# Patient Record
Sex: MALE | Race: WHITE | NOT HISPANIC OR LATINO | Employment: FULL TIME | URBAN - METROPOLITAN AREA
[De-identification: names, ages, dates, MRNs, and addresses within clinical notes are randomized per-mention and may not be internally consistent; named-entity substitution may affect disease eponyms.]

---

## 2023-04-20 ENCOUNTER — OFFICE VISIT (OUTPATIENT)
Dept: PRIMARY CARE | Facility: CLINIC | Age: 63
End: 2023-04-20
Payer: COMMERCIAL

## 2023-04-20 DIAGNOSIS — F41.1 GENERALIZED ANXIETY DISORDER: Primary | ICD-10-CM

## 2023-04-20 DIAGNOSIS — R35.1 NOCTURIA: ICD-10-CM

## 2023-04-20 DIAGNOSIS — R73.9 HYPERGLYCEMIA: ICD-10-CM

## 2023-04-20 DIAGNOSIS — I10 BENIGN ESSENTIAL HTN: Primary | ICD-10-CM

## 2023-04-20 DIAGNOSIS — E78.49 OTHER HYPERLIPIDEMIA: ICD-10-CM

## 2023-04-20 DIAGNOSIS — Z13.220 LIPID SCREENING: ICD-10-CM

## 2023-04-20 DIAGNOSIS — F41.1 GENERALIZED ANXIETY DISORDER: ICD-10-CM

## 2023-04-20 PROBLEM — G62.9 PERIPHERAL NEUROPATHY: Status: ACTIVE | Noted: 2023-04-20

## 2023-04-20 PROBLEM — I48.11 LONGSTANDING PERSISTENT ATRIAL FIBRILLATION (MULTI): Status: ACTIVE | Noted: 2023-04-20

## 2023-04-20 PROBLEM — R25.2 MUSCLE CRAMPS: Status: ACTIVE | Noted: 2023-04-20

## 2023-04-20 PROBLEM — E78.5 HLD (HYPERLIPIDEMIA): Status: ACTIVE | Noted: 2023-04-20

## 2023-04-20 PROBLEM — J20.9 ACUTE BRONCHITIS: Status: ACTIVE | Noted: 2023-04-20

## 2023-04-20 PROBLEM — I48.91 ATRIAL FIBRILLATION (MULTI): Status: ACTIVE | Noted: 2023-04-20

## 2023-04-20 PROBLEM — E87.5 POTASSIUM EXCESS: Status: ACTIVE | Noted: 2023-04-20

## 2023-04-20 PROBLEM — F41.9 ANXIETY DISORDER: Status: ACTIVE | Noted: 2023-04-20

## 2023-04-20 PROBLEM — K21.9 GERD WITHOUT ESOPHAGITIS: Status: ACTIVE | Noted: 2023-04-20

## 2023-04-20 PROBLEM — R07.9 CHEST PAIN: Status: ACTIVE | Noted: 2023-04-20

## 2023-04-20 PROBLEM — D12.6 TUBULAR ADENOMA OF COLON: Status: ACTIVE | Noted: 2023-04-20

## 2023-04-20 PROBLEM — K29.70 GASTRITIS: Status: ACTIVE | Noted: 2023-04-20

## 2023-04-20 PROBLEM — R93.1 ABNORMAL SCREENING CARDIAC CT: Status: ACTIVE | Noted: 2023-04-20

## 2023-04-20 PROBLEM — T78.40XA ALLERGIC REACTION: Status: ACTIVE | Noted: 2023-04-20

## 2023-04-20 PROBLEM — J02.9 ACUTE PHARYNGITIS: Status: ACTIVE | Noted: 2023-04-20

## 2023-04-20 PROBLEM — M54.9 BACK PAIN: Status: ACTIVE | Noted: 2023-04-20

## 2023-04-20 LAB
AMPHETAMINE (PRESENCE) IN URINE BY SCREEN METHOD: NORMAL
BARBITURATES PRESENCE IN URINE BY SCREEN METHOD: NORMAL
BENZODIAZEPINE (PRESENCE) IN URINE BY SCREEN METHOD: NORMAL
CANNABINOIDS IN URINE BY SCREEN METHOD: NORMAL
COCAINE (PRESENCE) IN URINE BY SCREEN METHOD: NORMAL
DRUG SCREEN COMMENT URINE: NORMAL
FENTANYL URINE: NORMAL
METHADONE (PRESENCE) IN URINE BY SCREEN METHOD: NORMAL
OPIATES (PRESENCE) IN URINE BY SCREEN METHOD: NORMAL
OXYCODONE (PRESENCE) IN URINE BY SCREEN METHOD: NORMAL
PHENCYCLIDINE (PRESENCE) IN URINE BY SCREEN METHOD: NORMAL
POC HDL CHOLESTEROL: 37 MG/DL (ref 0–40)
POC LDL CHOLESTEROL: 85 MG/DL (ref 0–100)
POC NON-HDL CHOLESTEROL: 116 MG/DL (ref 0–130)
POC TOTAL CHOLESTEROL/HDL RATIO: 4.1 (ref 0–4.5)
POC TOTAL CHOLESTEROL: 153 MG/DL (ref 0–199)
POC TRIGLYCERIDES: 157 MG/DL (ref 0–150)

## 2023-04-20 PROCEDURE — 80061 LIPID PANEL: CPT | Performed by: INTERNAL MEDICINE

## 2023-04-20 PROCEDURE — 99214 OFFICE O/P EST MOD 30 MIN: CPT | Performed by: INTERNAL MEDICINE

## 2023-04-20 PROCEDURE — 80307 DRUG TEST PRSMV CHEM ANLYZR: CPT

## 2023-04-20 RX ORDER — VIT C/E/ZN/COPPR/LUTEIN/ZEAXAN 250MG-90MG
1 CAPSULE ORAL DAILY
COMMUNITY

## 2023-04-20 RX ORDER — ATORVASTATIN CALCIUM 40 MG/1
TABLET, FILM COATED ORAL
COMMUNITY
End: 2023-05-17 | Stop reason: SDUPTHER

## 2023-04-20 RX ORDER — APIXABAN 5 MG/1
1 TABLET, FILM COATED ORAL 2 TIMES DAILY
COMMUNITY
Start: 2022-06-29 | End: 2023-07-27 | Stop reason: ALTCHOICE

## 2023-04-20 RX ORDER — MULTIVIT WITH MINERALS/HERBS
1 TABLET ORAL DAILY
COMMUNITY
Start: 2022-07-26 | End: 2024-02-27 | Stop reason: ALTCHOICE

## 2023-04-20 RX ORDER — EZETIMIBE 10 MG/1
10 TABLET ORAL DAILY
COMMUNITY
End: 2024-02-20 | Stop reason: SDUPTHER

## 2023-04-20 RX ORDER — ACETAMINOPHEN 325 MG/1
TABLET ORAL EVERY 6 HOURS PRN
COMMUNITY
Start: 2022-01-27

## 2023-04-20 RX ORDER — ALPRAZOLAM 0.25 MG/1
0.25 TABLET ORAL DAILY PRN
Qty: 30 TABLET | Refills: 0 | Status: SHIPPED | OUTPATIENT
Start: 2023-04-20

## 2023-04-20 RX ORDER — ALPRAZOLAM 0.25 MG/1
1 TABLET ORAL DAILY PRN
COMMUNITY
Start: 2020-10-01 | End: 2023-04-20 | Stop reason: SDUPTHER

## 2023-04-20 RX ORDER — CARVEDILOL 25 MG/1
25 TABLET ORAL 2 TIMES DAILY
COMMUNITY
End: 2024-02-20 | Stop reason: SDUPTHER

## 2023-04-20 RX ORDER — ALPRAZOLAM 0.25 MG/1
0.25 TABLET ORAL NIGHTLY PRN
Qty: 30 TABLET | Refills: 0 | Status: CANCELLED | OUTPATIENT
Start: 2023-04-20 | End: 2023-05-20

## 2023-04-20 RX ORDER — DILTIAZEM HYDROCHLORIDE 240 MG/1
1 CAPSULE, COATED, EXTENDED RELEASE ORAL DAILY
COMMUNITY
Start: 2022-11-28 | End: 2023-08-29 | Stop reason: ALTCHOICE

## 2023-04-20 RX ORDER — METOPROLOL TARTRATE 25 MG/1
1 TABLET, FILM COATED ORAL 2 TIMES DAILY
COMMUNITY
Start: 2023-03-02 | End: 2023-07-27 | Stop reason: ALTCHOICE

## 2023-04-20 RX ORDER — CHLORTHALIDONE 25 MG/1
1 TABLET ORAL DAILY
COMMUNITY
Start: 2023-03-27 | End: 2023-07-27 | Stop reason: ALTCHOICE

## 2023-04-20 RX ORDER — BENAZEPRIL HYDROCHLORIDE 40 MG/1
40 TABLET ORAL DAILY
COMMUNITY
End: 2023-12-26 | Stop reason: SDUPTHER

## 2023-04-20 RX ORDER — AMLODIPINE AND BENAZEPRIL HYDROCHLORIDE 10; 40 MG/1; MG/1
CAPSULE ORAL
COMMUNITY
End: 2024-02-20 | Stop reason: ALTCHOICE

## 2023-04-20 NOTE — PROGRESS NOTES
Enrrique Velásquez is a 62 y.o. male   Patient with a past medical history of Atrial Fibrillation (post DCCV), HTN, HLD, Anxiety disorder, GERD, elevated calciums scores. Tubular Adenoma due in 2026    Had fatigue from Metoprolol  Switched back to Carvedilol    Feels fine  No chest pain/  SOB/ dizziness  BM OK  Energy level ok  Appetite OK             Review of Systems     Constitutional: no fever, no chills, not feeling poorly, not feeling tired and no recent weight gain, no recent weight loss.   ENT: no earache, no hearing loss, no nosebleeds, no nasal discharge, no sore throat and no hoarseness.   Cardiovascular: the heart rate was not slow, the heart rate was not fast, no chest pain, no palpitations, no intermittent leg claudication and no lower extremity edema.   Respiratory: no cough, wheezing or shortness of breath at rest or exertion  Gastrointestinal: no abdominal pain, no constipation, no melena, no nausea, no diarrhea, no vomiting and no blood in stools.   Musculoskeletal: no arthralgias, no myalgias, no back pain, no joint swelling, no joint stiffness, no limb pain and no limb swelling.   Integumentary: no skin rashes, no skin lesions, no itching, no skin wound and no dry skin.   Neurological: no headache, no confusion, no numbness, no dizziness, no tingling and no fainting.   All other systems have been reviewed and are negative for complaint.       There were no vitals filed for this visit.     Physical Exam     Constitutional   General appearance: Alert and in no acute distress.     Pulmonary   Respiratory assessment: No respiratory distress, normal respiratory rhythm and effort.    Auscultation of Lungs: Clear bilateral breath sounds.   Cardiovascular   Auscultation of heart: Apical pulse normal, heart rate and rhythm normal, normal S1 and S2, no murmurs and no pericardial rub.    Exam for edema: No peripheral edema.   Abdomen   Abdominal Exam: No bruits, normal bowel sounds, soft, non-tender, no  abdominal mass palpated.    Liver and Spleen exam: No hepato-splenomegaly.   Musculoskeletal   Examination of gait: Normal.    Inspection of digits and nails: No clubbing or cyanosis of the fingernails.    Inspection/palpation of joints, bones and muscles: No joint swelling. Normal movement of all extremities.   Skin   Skin inspection: Normal skin color and pigmentation, normal skin turgor and no visible rash.   Neurologic   Cranial nerves: Nerves 2-12 were intact, no focal neuro defects.     Assessment/Plan             Patient with a past medical history of Atrial Fibrillation (post DCCV), HTN, HLD with elevated CAC, Anxiety disorder, GERD, elevated calciums scores. Tubular Adenoma due in 2026    # HTN  condition is stable  continue current medications      # Afib  s/p ablation now in NSR  ASA    # HLD/ Elevated CAC  Target LDL is less than 70  Avoid processed meats/ sugars/juices Instead eat fresh fruit  Add walnuts and almonds to your diet  exercise 6 days a week for 30 minutes        # Anxiety disorder  stable  OARRS report has been run and reviewed - no suspicious or worrisome activity noted.  I have considered the risk of abuse, dependance, addiction, and diversion.    I believe it is clinically appropriate for this patient to continue taking this medication.     # Neuropathy  stable

## 2023-04-29 ENCOUNTER — LAB (OUTPATIENT)
Dept: LAB | Facility: LAB | Age: 63
End: 2023-04-29
Payer: COMMERCIAL

## 2023-04-29 DIAGNOSIS — R35.1 NOCTURIA: ICD-10-CM

## 2023-04-29 DIAGNOSIS — I10 BENIGN ESSENTIAL HTN: ICD-10-CM

## 2023-04-29 DIAGNOSIS — R73.9 HYPERGLYCEMIA: ICD-10-CM

## 2023-04-29 LAB
ALANINE AMINOTRANSFERASE (SGPT) (U/L) IN SER/PLAS: 39 U/L (ref 10–52)
ALBUMIN (G/DL) IN SER/PLAS: 4.3 G/DL (ref 3.4–5)
ALKALINE PHOSPHATASE (U/L) IN SER/PLAS: 74 U/L (ref 33–136)
ANION GAP IN SER/PLAS: 13 MMOL/L (ref 10–20)
ASPARTATE AMINOTRANSFERASE (SGOT) (U/L) IN SER/PLAS: 21 U/L (ref 9–39)
BILIRUBIN TOTAL (MG/DL) IN SER/PLAS: 0.8 MG/DL (ref 0–1.2)
CALCIUM (MG/DL) IN SER/PLAS: 9.3 MG/DL (ref 8.6–10.6)
CARBON DIOXIDE, TOTAL (MMOL/L) IN SER/PLAS: 27 MMOL/L (ref 21–32)
CHLORIDE (MMOL/L) IN SER/PLAS: 103 MMOL/L (ref 98–107)
CREATININE (MG/DL) IN SER/PLAS: 0.9 MG/DL (ref 0.5–1.3)
ERYTHROCYTE DISTRIBUTION WIDTH (RATIO) BY AUTOMATED COUNT: 12.4 % (ref 11.5–14.5)
ERYTHROCYTE MEAN CORPUSCULAR HEMOGLOBIN CONCENTRATION (G/DL) BY AUTOMATED: 32.8 G/DL (ref 32–36)
ERYTHROCYTE MEAN CORPUSCULAR VOLUME (FL) BY AUTOMATED COUNT: 89 FL (ref 80–100)
ERYTHROCYTES (10*6/UL) IN BLOOD BY AUTOMATED COUNT: 4.72 X10E12/L (ref 4.5–5.9)
ESTIMATED AVERAGE GLUCOSE FOR HBA1C: 128 MG/DL
GFR MALE: >90 ML/MIN/1.73M2
GLUCOSE (MG/DL) IN SER/PLAS: 113 MG/DL (ref 74–99)
HEMATOCRIT (%) IN BLOOD BY AUTOMATED COUNT: 41.8 % (ref 41–52)
HEMOGLOBIN (G/DL) IN BLOOD: 13.7 G/DL (ref 13.5–17.5)
HEMOGLOBIN A1C/HEMOGLOBIN TOTAL IN BLOOD: 6.1 %
LEUKOCYTES (10*3/UL) IN BLOOD BY AUTOMATED COUNT: 7.9 X10E9/L (ref 4.4–11.3)
NRBC (PER 100 WBCS) BY AUTOMATED COUNT: 0 /100 WBC (ref 0–0)
PLATELETS (10*3/UL) IN BLOOD AUTOMATED COUNT: 263 X10E9/L (ref 150–450)
POTASSIUM (MMOL/L) IN SER/PLAS: 4.3 MMOL/L (ref 3.5–5.3)
PROSTATE SPECIFIC ANTIGEN,SCREEN: 3.07 NG/ML (ref 0–4)
PROTEIN TOTAL: 6.8 G/DL (ref 6.4–8.2)
SODIUM (MMOL/L) IN SER/PLAS: 139 MMOL/L (ref 136–145)
THYROTROPIN (MIU/L) IN SER/PLAS BY DETECTION LIMIT <= 0.05 MIU/L: 0.81 MIU/L (ref 0.44–3.98)
UREA NITROGEN (MG/DL) IN SER/PLAS: 24 MG/DL (ref 6–23)

## 2023-04-29 PROCEDURE — 84443 ASSAY THYROID STIM HORMONE: CPT

## 2023-04-29 PROCEDURE — 36415 COLL VENOUS BLD VENIPUNCTURE: CPT

## 2023-04-29 PROCEDURE — 80053 COMPREHEN METABOLIC PANEL: CPT

## 2023-04-29 PROCEDURE — 85027 COMPLETE CBC AUTOMATED: CPT

## 2023-04-29 PROCEDURE — 83036 HEMOGLOBIN GLYCOSYLATED A1C: CPT

## 2023-04-29 PROCEDURE — 84153 ASSAY OF PSA TOTAL: CPT

## 2023-05-17 DIAGNOSIS — E78.49 OTHER HYPERLIPIDEMIA: Primary | ICD-10-CM

## 2023-05-18 RX ORDER — ATORVASTATIN CALCIUM 40 MG/1
40 TABLET, FILM COATED ORAL ONCE
Qty: 90 TABLET | Refills: 0 | Status: SHIPPED | OUTPATIENT
Start: 2023-05-18 | End: 2023-08-10

## 2023-07-27 ENCOUNTER — OFFICE VISIT (OUTPATIENT)
Dept: PRIMARY CARE | Facility: CLINIC | Age: 63
End: 2023-07-27
Payer: COMMERCIAL

## 2023-07-27 VITALS
TEMPERATURE: 98.1 F | DIASTOLIC BLOOD PRESSURE: 79 MMHG | SYSTOLIC BLOOD PRESSURE: 124 MMHG | BODY MASS INDEX: 34.59 KG/M2 | WEIGHT: 262.2 LBS | HEART RATE: 70 BPM

## 2023-07-27 DIAGNOSIS — I10 BENIGN ESSENTIAL HTN: Primary | ICD-10-CM

## 2023-07-27 DIAGNOSIS — F41.9 ANXIETY DISORDER, UNSPECIFIED TYPE: ICD-10-CM

## 2023-07-27 DIAGNOSIS — E78.49 OTHER HYPERLIPIDEMIA: ICD-10-CM

## 2023-07-27 DIAGNOSIS — E74.39 GLUCOSE INTOLERANCE: ICD-10-CM

## 2023-07-27 DIAGNOSIS — E13.69 OTHER SPECIFIED DIABETES MELLITUS WITH OTHER SPECIFIED COMPLICATION, UNSPECIFIED WHETHER LONG TERM INSULIN USE (MULTI): ICD-10-CM

## 2023-07-27 LAB — POC FINGERSTICK BLOOD GLUCOSE: 113 MG/DL (ref 70–100)

## 2023-07-27 PROCEDURE — 3078F DIAST BP <80 MM HG: CPT | Performed by: INTERNAL MEDICINE

## 2023-07-27 PROCEDURE — 4010F ACE/ARB THERAPY RXD/TAKEN: CPT | Performed by: INTERNAL MEDICINE

## 2023-07-27 PROCEDURE — 82962 GLUCOSE BLOOD TEST: CPT | Performed by: INTERNAL MEDICINE

## 2023-07-27 PROCEDURE — 3044F HG A1C LEVEL LT 7.0%: CPT | Performed by: INTERNAL MEDICINE

## 2023-07-27 PROCEDURE — 3074F SYST BP LT 130 MM HG: CPT | Performed by: INTERNAL MEDICINE

## 2023-07-27 PROCEDURE — 99214 OFFICE O/P EST MOD 30 MIN: CPT | Performed by: INTERNAL MEDICINE

## 2023-07-27 RX ORDER — AZITHROMYCIN 250 MG/1
TABLET, FILM COATED ORAL
COMMUNITY
End: 2024-02-27 | Stop reason: ALTCHOICE

## 2023-07-27 RX ORDER — CLINDAMYCIN HYDROCHLORIDE 150 MG/1
CAPSULE ORAL
COMMUNITY
End: 2024-02-27 | Stop reason: ALTCHOICE

## 2023-07-27 RX ORDER — CHLORHEXIDINE GLUCONATE ORAL RINSE 1.2 MG/ML
SOLUTION DENTAL
COMMUNITY
End: 2024-02-27 | Stop reason: ALTCHOICE

## 2023-07-27 NOTE — PROGRESS NOTES
Enrrique Velásquez is a 62 y.o. male   Patient with a past medical history of Atrial Fibrillation (post DCCV), HTN, HLD, Anxiety disorder, GERD, elevated calciums scores. Glucose intolerance, Tubular Adenoma due in 2026    Has been sweating a lot and peeing a lot  Always thirsty  Feels it is the chlorthalidone      Feels fine  No chest pain/  SOB/ dizziness  BM OK  Energy level ok  Appetite OK             Review of Systems     Constitutional: no fever, no chills, not feeling poorly, not feeling tired and no recent weight gain, no recent weight loss.   ENT: no earache, no hearing loss, no nosebleeds, no nasal discharge, no sore throat and no hoarseness.   Cardiovascular: the heart rate was not slow, the heart rate was not fast, no chest pain, no palpitations, no intermittent leg claudication and no lower extremity edema.   Respiratory: no cough, wheezing or shortness of breath at rest or exertion  Gastrointestinal: no abdominal pain, no constipation, no melena, no nausea, no diarrhea, no vomiting and no blood in stools.   Musculoskeletal: no arthralgias, no myalgias, no back pain, no joint swelling, no joint stiffness, no limb pain and no limb swelling.   Integumentary: no skin rashes, no skin lesions, no itching, no skin wound and no dry skin.   Neurological: no headache, no confusion, no numbness, no dizziness, no tingling and no fainting.   All other systems have been reviewed and are negative for complaint.       Vitals:    07/27/23 1612   BP: 124/79   Pulse: 70   Temp: 36.7 °C (98.1 °F)        Physical Exam     Constitutional   General appearance: Alert and in no acute distress.     Pulmonary   Respiratory assessment: No respiratory distress, normal respiratory rhythm and effort.    Auscultation of Lungs: Clear bilateral breath sounds.   Cardiovascular   Auscultation of heart: Apical pulse normal, heart rate and rhythm normal, normal S1 and S2, no murmurs and no pericardial rub.    Exam for edema: No peripheral  edema.   Abdomen   Abdominal Exam: No bruits, normal bowel sounds, soft, non-tender, no abdominal mass palpated.    Liver and Spleen exam: No hepato-splenomegaly.   Musculoskeletal   Examination of gait: Normal.    Inspection of digits and nails: No clubbing or cyanosis of the fingernails.    Inspection/palpation of joints, bones and muscles: No joint swelling. Normal movement of all extremities.   Skin   Skin inspection: Normal skin color and pigmentation, normal skin turgor and no visible rash.   Neurologic   Cranial nerves: Nerves 2-12 were intact, no focal neuro defects.     Assessment/Plan             Patient with a past medical history of Atrial Fibrillation (post DCCV), HTN, HLD with elevated CAC, Anxiety disorder, GERD, elevated calciums scores. Glucose intolerance, Tubular Adenoma due in 2026    # HTN  DC Chlorthalidone      # Afib  s/p ablation now in NSR  ASA    # HLD/ Elevated CAC  Target LDL is less than 70  Avoid processed meats/ sugars/juices Instead eat fresh fruit  Add walnuts and almonds to your diet  exercise 6 days a week for 30 minutes        # Anxiety disorder  stable  OARRS report has been run and reviewed - no suspicious or worrisome activity noted.  I have considered the risk of abuse, dependance, addiction, and diversion.    I believe it is clinically appropriate for this patient to continue taking this medication.     # Neuropathy  stable    #Glucose Intolerance  Watch diet   Lose weight

## 2023-08-03 ENCOUNTER — PATIENT OUTREACH (OUTPATIENT)
Dept: CARE COORDINATION | Facility: CLINIC | Age: 63
End: 2023-08-03
Payer: COMMERCIAL

## 2023-08-03 NOTE — PROGRESS NOTES
Discharge Facility:[facility]  Discharge Diagnosis:AFIB RVR  Admission Date:08/01/23  Discharge Date: 08/02/23    PCP Appointment Date:  Specialist Appointment Date:   Hospital Encounter and Summary: Linked   See discharge assessment below for further details  Engagement  Call Start Time: 0910 (8/3/2023  9:10 AM)    Medications  Medications reviewed with patient/caregiver?: Yes (new meds only apixaban 5mg bid) (8/3/2023  9:10 AM)  Is the patient having any side effects they believe may be caused by any medication additions or changes?: No (8/3/2023  9:10 AM)  Does the patient have all medications ordered at discharge?: Yes (8/3/2023  9:10 AM)  Is the patient taking all medications as directed (includes completed medication regime)?: Yes (8/3/2023  9:10 AM)    Appointments  Does the patient have a primary care provider?: Yes (8/3/2023  9:10 AM)  Care Management Interventions: Advised patient to make appointment (8/3/2023  9:10 AM)    Self Management  Has home health visited the patient within 72 hours of discharge?: Not applicable (8/3/2023  9:10 AM)  Has all Durable Medical Equipment (DME) been delivered?: No (8/3/2023  9:10 AM)    Patient Teaching  Does the patient have access to their discharge instructions?: Yes (8/3/2023  9:10 AM)  Care Management Interventions: Reviewed instructions with patient (8/3/2023  9:10 AM)  What is the patient's perception of their health status since discharge?: Improving (8/3/2023  9:10 AM)    Wrap Up  Call End Time: 0915 (8/3/2023  9:10 AM)

## 2023-08-10 DIAGNOSIS — E78.49 OTHER HYPERLIPIDEMIA: ICD-10-CM

## 2023-08-10 RX ORDER — ATORVASTATIN CALCIUM 40 MG/1
40 TABLET, FILM COATED ORAL DAILY
Qty: 90 TABLET | Refills: 0 | Status: SHIPPED | OUTPATIENT
Start: 2023-08-10 | End: 2023-10-31

## 2023-08-17 ENCOUNTER — PATIENT OUTREACH (OUTPATIENT)
Dept: CARE COORDINATION | Facility: CLINIC | Age: 63
End: 2023-08-17
Payer: COMMERCIAL

## 2023-08-17 NOTE — PROGRESS NOTES
Unable to reach patient for call back after patient's follow up appointment with PCP.   DAVIDM with call back number for patient to call if needed   If no voicemail available call attempts x 2 were made to contact the patient to assist with any questions or concerns patient may have.

## 2023-08-29 ENCOUNTER — OFFICE VISIT (OUTPATIENT)
Dept: PRIMARY CARE | Facility: CLINIC | Age: 63
End: 2023-08-29
Payer: COMMERCIAL

## 2023-08-29 VITALS
TEMPERATURE: 97.7 F | RESPIRATION RATE: 16 BRPM | WEIGHT: 259.4 LBS | DIASTOLIC BLOOD PRESSURE: 90 MMHG | HEART RATE: 70 BPM | SYSTOLIC BLOOD PRESSURE: 140 MMHG | BODY MASS INDEX: 34.22 KG/M2

## 2023-08-29 DIAGNOSIS — I48.0 PAROXYSMAL ATRIAL FIBRILLATION (MULTI): Primary | ICD-10-CM

## 2023-08-29 DIAGNOSIS — I10 BENIGN ESSENTIAL HTN: ICD-10-CM

## 2023-08-29 PROBLEM — J36 PERITONSILLAR ABSCESS: Status: ACTIVE | Noted: 2020-05-12

## 2023-08-29 PROBLEM — I95.9 HYPOTENSION: Status: ACTIVE | Noted: 2023-08-29

## 2023-08-29 PROBLEM — R74.8 HIGH LEVEL OF CARDIAC MARKER: Status: ACTIVE | Noted: 2023-08-29

## 2023-08-29 PROCEDURE — 3080F DIAST BP >= 90 MM HG: CPT | Performed by: INTERNAL MEDICINE

## 2023-08-29 PROCEDURE — 99213 OFFICE O/P EST LOW 20 MIN: CPT | Performed by: INTERNAL MEDICINE

## 2023-08-29 PROCEDURE — 3077F SYST BP >= 140 MM HG: CPT | Performed by: INTERNAL MEDICINE

## 2023-08-29 RX ORDER — PRAVASTATIN SODIUM 80 MG/1
TABLET ORAL
COMMUNITY
End: 2023-10-31 | Stop reason: ALTCHOICE

## 2023-08-29 RX ORDER — ASPIRIN 81 MG/1
1 TABLET ORAL DAILY
COMMUNITY
Start: 2023-02-14

## 2023-08-29 RX ORDER — MAGNESIUM HYDROXIDE 400 MG/5ML
1 SUSPENSION, ORAL (FINAL DOSE FORM) ORAL DAILY
COMMUNITY

## 2023-08-29 NOTE — PROGRESS NOTES
Enrrique Velásquez is a 62 y.o. male   Patient with a past medical history of Atrial Fibrillation (post DCCV), HTN, HLD, Anxiety disorder, GERD, elevated calciums scores. Glucose intolerance, Tubular Adenoma due in 2026    Admit Aug 1  Disch Aug 2      Feels fine  No chest pain/  SOB/ dizziness  BM OK  Energy level ok  Appetite OK           Review of Systems     Constitutional: no fever, no chills, not feeling poorly, not feeling tired and no recent weight gain, no recent weight loss.   ENT: no earache, no hearing loss, no nosebleeds, no nasal discharge, no sore throat and no hoarseness.   Cardiovascular: the heart rate was not slow, the heart rate was not fast, no chest pain, no palpitations, no intermittent leg claudication and no lower extremity edema.   Respiratory: no cough, wheezing or shortness of breath at rest or exertion  Gastrointestinal: no abdominal pain, no constipation, no melena, no nausea, no diarrhea, no vomiting and no blood in stools.   Musculoskeletal: no arthralgias, no myalgias, no back pain, no joint swelling, no joint stiffness, no limb pain and no limb swelling.   Integumentary: no skin rashes, no skin lesions, no itching, no skin wound and no dry skin.   Neurological: no headache, no confusion, no numbness, no dizziness, no tingling and no fainting.   All other systems have been reviewed and are negative for complaint.       Vitals:    08/29/23 1639   Temp: 36.5 °C (97.7 °F)        Physical Exam     Constitutional   General appearance: Alert and in no acute distress.     Pulmonary   Respiratory assessment: No respiratory distress, normal respiratory rhythm and effort.    Auscultation of Lungs: Clear bilateral breath sounds.   Cardiovascular   Auscultation of heart: Apical pulse normal, heart rate and rhythm normal, normal S1 and S2, no murmurs and no pericardial rub.    Exam for edema: No peripheral edema.   Abdomen   Abdominal Exam: No bruits, normal bowel sounds, soft, non-tender, no  abdominal mass palpated.    Liver and Spleen exam: No hepato-splenomegaly.   Musculoskeletal   Examination of gait: Normal.    Inspection of digits and nails: No clubbing or cyanosis of the fingernails.    Inspection/palpation of joints, bones and muscles: No joint swelling. Normal movement of all extremities.   Skin   Skin inspection: Normal skin color and pigmentation, normal skin turgor and no visible rash.   Neurologic   Cranial nerves: Nerves 2-12 were intact, no focal neuro defects.     Assessment/Plan             Patient with a past medical history of Atrial Fibrillation (post DCCV), HTN, HLD with elevated CAC, Anxiety disorder, GERD, elevated calciums scores. Glucose intolerance, Tubular Adenoma due in 2026    # HTN  Stable  BP at home is average 120/80      # Afib  PAF  S/p cardioversion  Back on Eliquis  Light duty until October          # Anxiety disorder  stable  OARRS report has been run and reviewed - no suspicious or worrisome activity noted.  I have considered the risk of abuse, dependance, addiction, and diversion.    I believe it is clinically appropriate for this patient to continue taking this medication.

## 2023-08-31 ENCOUNTER — PATIENT OUTREACH (OUTPATIENT)
Dept: CARE COORDINATION | Facility: CLINIC | Age: 63
End: 2023-08-31
Payer: COMMERCIAL

## 2023-08-31 NOTE — PROGRESS NOTES
Unable to reach patient for one month post discharge follow up call.   LVM with call back number for patient to call if needed   If no voicemail available call attempts x 2 were made to contact the patient to assist with any questions or concerns patient may have.

## 2023-10-30 ENCOUNTER — PATIENT OUTREACH (OUTPATIENT)
Dept: CARE COORDINATION | Facility: CLINIC | Age: 63
End: 2023-10-30
Payer: COMMERCIAL

## 2023-10-30 NOTE — PROGRESS NOTES
Patient has met target of no readmission for (90) days post (hospital,  discharge and is graduated from Transitional Care Management program at this time.

## 2023-10-31 ENCOUNTER — OFFICE VISIT (OUTPATIENT)
Dept: PRIMARY CARE | Facility: CLINIC | Age: 63
End: 2023-10-31
Payer: COMMERCIAL

## 2023-10-31 VITALS
SYSTOLIC BLOOD PRESSURE: 130 MMHG | BODY MASS INDEX: 34.22 KG/M2 | TEMPERATURE: 97.6 F | DIASTOLIC BLOOD PRESSURE: 80 MMHG | HEART RATE: 72 BPM | RESPIRATION RATE: 16 BRPM | WEIGHT: 259.4 LBS

## 2023-10-31 DIAGNOSIS — E78.49 OTHER HYPERLIPIDEMIA: ICD-10-CM

## 2023-10-31 DIAGNOSIS — Z23 INFLUENZA VACCINATION ADMINISTERED AT CURRENT VISIT: ICD-10-CM

## 2023-10-31 DIAGNOSIS — I48.0 PAROXYSMAL ATRIAL FIBRILLATION (MULTI): ICD-10-CM

## 2023-10-31 DIAGNOSIS — I10 BENIGN ESSENTIAL HTN: Primary | ICD-10-CM

## 2023-10-31 LAB
POC HDL CHOLESTEROL: 41 MG/DL (ref 0–40)
POC LDL CHOLESTEROL: 108 MG/DL (ref 0–100)
POC NON-HDL CHOLESTEROL: 127 MG/DL (ref 0–130)
POC TOTAL CHOLESTEROL/HDL RATIO: 4.1 (ref 0–4.5)
POC TOTAL CHOLESTEROL: 169 MG/DL (ref 0–199)
POC TRIGLYCERIDES: 96 MG/DL (ref 0–150)

## 2023-10-31 PROCEDURE — 80061 LIPID PANEL: CPT | Performed by: INTERNAL MEDICINE

## 2023-10-31 PROCEDURE — 90686 IIV4 VACC NO PRSV 0.5 ML IM: CPT | Performed by: INTERNAL MEDICINE

## 2023-10-31 PROCEDURE — 3075F SYST BP GE 130 - 139MM HG: CPT | Performed by: INTERNAL MEDICINE

## 2023-10-31 PROCEDURE — 3079F DIAST BP 80-89 MM HG: CPT | Performed by: INTERNAL MEDICINE

## 2023-10-31 PROCEDURE — 99214 OFFICE O/P EST MOD 30 MIN: CPT | Performed by: INTERNAL MEDICINE

## 2023-10-31 PROCEDURE — 90471 IMMUNIZATION ADMIN: CPT | Performed by: INTERNAL MEDICINE

## 2023-10-31 RX ORDER — ATORVASTATIN CALCIUM 80 MG/1
80 TABLET, FILM COATED ORAL EVERY OTHER DAY
Qty: 15 TABLET | Refills: 5
Start: 2023-10-31 | End: 2024-05-09 | Stop reason: SDUPTHER

## 2023-10-31 NOTE — PROGRESS NOTES
Enrrique Velásquez is a 62 y.o. male   Patient with a past medical history of Atrial Fibrillation (post DCCV), HTN, HLD, Anxiety disorder, GERD, elevated calciums scores. Glucose intolerance, Tubular Adenoma due in 2026    Feels fine  No chest pain/  SOB/ dizziness  BM OK  Energy level ok  Appetite OK             Review of Systems     Constitutional: no fever, no chills, not feeling poorly, not feeling tired and no recent weight gain, no recent weight loss.   ENT: no earache, no hearing loss, no nosebleeds, no nasal discharge, no sore throat and no hoarseness.   Cardiovascular: the heart rate was not slow, the heart rate was not fast, no chest pain, no palpitations, no intermittent leg claudication and no lower extremity edema.   Respiratory: no cough, wheezing or shortness of breath at rest or exertion  Gastrointestinal: no abdominal pain, no constipation, no melena, no nausea, no diarrhea, no vomiting and no blood in stools.   Musculoskeletal: no arthralgias, no myalgias, no back pain, no joint swelling, no joint stiffness, no limb pain and no limb swelling.   Integumentary: no skin rashes, no skin lesions, no itching, no skin wound and no dry skin.   Neurological: no headache, no confusion, no numbness, no dizziness, no tingling and no fainting.   All other systems have been reviewed and are negative for complaint.       Vitals:    10/31/23 1620   Temp: 36.4 °C (97.6 °F)        Physical Exam     Constitutional   General appearance: Alert and in no acute distress.     Pulmonary   Respiratory assessment: No respiratory distress, normal respiratory rhythm and effort.    Auscultation of Lungs: Clear bilateral breath sounds.   Cardiovascular   Auscultation of heart: Apical pulse normal, heart rate and rhythm normal, normal S1 and S2, no murmurs and no pericardial rub.    Exam for edema: No peripheral edema.   Abdomen   Abdominal Exam: No bruits, normal bowel sounds, soft, non-tender, no abdominal mass palpated.    Liver  and Spleen exam: No hepato-splenomegaly.   Musculoskeletal   Examination of gait: Normal.    Inspection of digits and nails: No clubbing or cyanosis of the fingernails.    Inspection/palpation of joints, bones and muscles: No joint swelling. Normal movement of all extremities.   Skin   Skin inspection: Normal skin color and pigmentation, normal skin turgor and no visible rash.   Neurologic   Cranial nerves: Nerves 2-12 were intact, no focal neuro defects.     Assessment/Plan             Patient with a past medical history of Atrial Fibrillation (post DCCV), HTN, HLD with elevated CAC, Anxiety disorder, GERD, elevated calciums scores. Glucose intolerance, Tubular Adenoma due in 2026    # HTN  Stable  BP at home is average 120/80      # Afib  PAF  S/p cardioversion  Back on Eliquis  In NSR    # HLD  Elevated  Watch salt, avoid excessive caffeine  Avoid processed meats/ sugars/juices Instead eat fresh fruit  Add walnuts and almonds to your diet  exercise 6 days a week for 30 minutes        # Anxiety disorder  stable  OARRS report has been run and reviewed - no suspicious or worrisome activity noted.  I have considered the risk of abuse, dependance, addiction, and diversion.    I believe it is clinically appropriate for this patient to continue taking this medication.

## 2023-11-13 DIAGNOSIS — E78.49 OTHER HYPERLIPIDEMIA: ICD-10-CM

## 2023-11-14 RX ORDER — ATORVASTATIN CALCIUM 40 MG/1
40 TABLET, FILM COATED ORAL DAILY
Qty: 90 TABLET | Refills: 0 | Status: SHIPPED | OUTPATIENT
Start: 2023-11-14 | End: 2024-02-07 | Stop reason: SDUPTHER

## 2023-12-26 DIAGNOSIS — I10 BENIGN ESSENTIAL HTN: ICD-10-CM

## 2023-12-26 RX ORDER — BENAZEPRIL HYDROCHLORIDE 40 MG/1
40 TABLET ORAL DAILY
Qty: 30 TABLET | Refills: 0 | Status: SHIPPED | OUTPATIENT
Start: 2023-12-26 | End: 2024-01-29

## 2023-12-26 NOTE — TELEPHONE ENCOUNTER
Rx Refill Request Telephone Encounter    Name:  Enrrique Velásquez  :  [phone]  Specific Pharmacy location:  [facility]

## 2024-01-29 DIAGNOSIS — I10 BENIGN ESSENTIAL HTN: ICD-10-CM

## 2024-01-29 RX ORDER — BENAZEPRIL HYDROCHLORIDE 40 MG/1
40 TABLET ORAL DAILY
Qty: 30 TABLET | Refills: 0 | Status: SHIPPED | OUTPATIENT
Start: 2024-01-29 | End: 2024-02-20 | Stop reason: SDUPTHER

## 2024-02-06 ENCOUNTER — APPOINTMENT (OUTPATIENT)
Dept: PRIMARY CARE | Facility: CLINIC | Age: 64
End: 2024-02-06
Payer: COMMERCIAL

## 2024-02-07 DIAGNOSIS — E78.49 OTHER HYPERLIPIDEMIA: ICD-10-CM

## 2024-02-07 RX ORDER — ATORVASTATIN CALCIUM 40 MG/1
40 TABLET, FILM COATED ORAL DAILY
Qty: 90 TABLET | Refills: 0 | Status: SHIPPED | OUTPATIENT
Start: 2024-02-07 | End: 2024-02-20 | Stop reason: ALTCHOICE

## 2024-02-07 NOTE — TELEPHONE ENCOUNTER
Rx Refill Request Telephone Encounter    Name:  Enrrique Velásquez  :  [phone]  Specific Pharmacy location:  [facility]

## 2024-02-13 ENCOUNTER — APPOINTMENT (OUTPATIENT)
Dept: CARDIOLOGY | Facility: CLINIC | Age: 64
End: 2024-02-13
Payer: COMMERCIAL

## 2024-02-20 ENCOUNTER — OFFICE VISIT (OUTPATIENT)
Dept: PRIMARY CARE | Facility: CLINIC | Age: 64
End: 2024-02-20
Payer: COMMERCIAL

## 2024-02-20 VITALS
WEIGHT: 264.2 LBS | TEMPERATURE: 98.2 F | BODY MASS INDEX: 34.86 KG/M2 | RESPIRATION RATE: 16 BRPM | DIASTOLIC BLOOD PRESSURE: 80 MMHG | HEART RATE: 72 BPM | SYSTOLIC BLOOD PRESSURE: 140 MMHG

## 2024-02-20 DIAGNOSIS — E78.49 OTHER HYPERLIPIDEMIA: ICD-10-CM

## 2024-02-20 DIAGNOSIS — I10 BENIGN ESSENTIAL HTN: ICD-10-CM

## 2024-02-20 LAB
POC HDL CHOLESTEROL: 45 MG/DL (ref 0–40)
POC LDL CHOLESTEROL: 98 MG/DL (ref 0–100)
POC NON-HDL CHOLESTEROL: 120 MG/DL (ref 0–130)
POC TOTAL CHOLESTEROL/HDL RATIO: 3.7 (ref 0–4.5)
POC TOTAL CHOLESTEROL: 166 MG/DL (ref 0–199)
POC TRIGLYCERIDES: 113 MG/DL (ref 0–150)

## 2024-02-20 PROCEDURE — 3077F SYST BP >= 140 MM HG: CPT | Performed by: INTERNAL MEDICINE

## 2024-02-20 PROCEDURE — 3079F DIAST BP 80-89 MM HG: CPT | Performed by: INTERNAL MEDICINE

## 2024-02-20 PROCEDURE — 80061 LIPID PANEL: CPT | Performed by: INTERNAL MEDICINE

## 2024-02-20 PROCEDURE — 99214 OFFICE O/P EST MOD 30 MIN: CPT | Performed by: INTERNAL MEDICINE

## 2024-02-20 RX ORDER — BENAZEPRIL HYDROCHLORIDE 40 MG/1
40 TABLET ORAL DAILY
Qty: 90 TABLET | Refills: 3 | Status: SHIPPED | OUTPATIENT
Start: 2024-02-20

## 2024-02-20 RX ORDER — ATORVASTATIN CALCIUM 80 MG/1
80 TABLET, FILM COATED ORAL DAILY
Qty: 90 TABLET | Refills: 3 | Status: SHIPPED | OUTPATIENT
Start: 2024-02-20 | End: 2024-03-18 | Stop reason: ALTCHOICE

## 2024-02-20 RX ORDER — CARVEDILOL 25 MG/1
25 TABLET ORAL 2 TIMES DAILY
Qty: 180 TABLET | Refills: 3 | Status: SHIPPED | OUTPATIENT
Start: 2024-02-20

## 2024-02-20 RX ORDER — EZETIMIBE 10 MG/1
10 TABLET ORAL DAILY
Qty: 90 TABLET | Refills: 3 | Status: SHIPPED | OUTPATIENT
Start: 2024-02-20

## 2024-02-20 NOTE — PROGRESS NOTES
Enrrique Velásquez is a 63 y.o. male   Patient with a past medical history of Atrial Fibrillation (post DCCV), HTN, HLD, Anxiety disorder, GERD, elevated calciums scores. Glucose intolerance, Tubular Adenoma due in 2026    Feels fine  No chest pain/  SOB/ dizziness  BM OK  Energy level ok  Appetite OK           Review of Systems     Constitutional: no fever, no chills, not feeling poorly, not feeling tired and no recent weight gain, no recent weight loss.   ENT: no earache, no hearing loss, no nosebleeds, no nasal discharge, no sore throat and no hoarseness.   Cardiovascular: the heart rate was not slow, the heart rate was not fast, no chest pain, no palpitations, no intermittent leg claudication and no lower extremity edema.   Respiratory: no cough, wheezing or shortness of breath at rest or exertion  Gastrointestinal: no abdominal pain, no constipation, no melena, no nausea, no diarrhea, no vomiting and no blood in stools.   Musculoskeletal: no arthralgias, no myalgias, no back pain, no joint swelling, no joint stiffness, no limb pain and no limb swelling.   Integumentary: no skin rashes, no skin lesions, no itching, no skin wound and no dry skin.   Neurological: no headache, no confusion, no numbness, no dizziness, no tingling and no fainting.   All other systems have been reviewed and are negative for complaint.       There were no vitals filed for this visit.       Physical Exam     Constitutional   General appearance: Alert and in no acute distress.     Pulmonary   Respiratory assessment: No respiratory distress, normal respiratory rhythm and effort.    Auscultation of Lungs: Clear bilateral breath sounds.   Cardiovascular   Auscultation of heart: Apical pulse normal, heart rate and rhythm normal, normal S1 and S2, no murmurs and no pericardial rub.    Exam for edema: No peripheral edema.   Abdomen   Abdominal Exam: No bruits, normal bowel sounds, soft, non-tender, no abdominal mass palpated.    Liver and Spleen  exam: No hepato-splenomegaly.   Musculoskeletal   Examination of gait: Normal.    Inspection of digits and nails: No clubbing or cyanosis of the fingernails.    Inspection/palpation of joints, bones and muscles: No joint swelling. Normal movement of all extremities.   Skin   Skin inspection: Normal skin color and pigmentation, normal skin turgor and no visible rash.   Neurologic   Cranial nerves: Nerves 2-12 were intact, no focal neuro defects.     Assessment/Plan             Patient with a past medical history of Atrial Fibrillation (post DCCV), HTN, HLD with elevated CAC, Anxiety disorder, GERD, elevated calciums scores. Glucose intolerance, Tubular Adenoma due in 2026    # HTN  Stable  BP at home is average 120/80      # Afib  PAF  S/p cardioversion  Back on Eliquis  In NSR    # HLD  Elevated again  Stop the daily eggs  Watch salt, avoid excessive caffeine  Avoid processed meats/ sugars/juices Instead eat fresh fruit  Add walnuts and almonds to your diet  exercise 6 days a week for 30 minutes        # Anxiety disorder  stable

## 2024-02-27 ENCOUNTER — OFFICE VISIT (OUTPATIENT)
Dept: CARDIOLOGY | Facility: CLINIC | Age: 64
End: 2024-02-27
Payer: COMMERCIAL

## 2024-02-27 VITALS
WEIGHT: 262 LBS | DIASTOLIC BLOOD PRESSURE: 95 MMHG | SYSTOLIC BLOOD PRESSURE: 165 MMHG | OXYGEN SATURATION: 95 % | HEIGHT: 73 IN | HEART RATE: 82 BPM | BODY MASS INDEX: 34.72 KG/M2

## 2024-02-27 DIAGNOSIS — I48.0 PAROXYSMAL ATRIAL FIBRILLATION (MULTI): Primary | ICD-10-CM

## 2024-02-27 PROCEDURE — 99214 OFFICE O/P EST MOD 30 MIN: CPT | Performed by: INTERNAL MEDICINE

## 2024-02-27 PROCEDURE — 3077F SYST BP >= 140 MM HG: CPT | Performed by: INTERNAL MEDICINE

## 2024-02-27 PROCEDURE — 93005 ELECTROCARDIOGRAM TRACING: CPT | Performed by: INTERNAL MEDICINE

## 2024-02-27 PROCEDURE — 3080F DIAST BP >= 90 MM HG: CPT | Performed by: INTERNAL MEDICINE

## 2024-02-27 PROCEDURE — 1036F TOBACCO NON-USER: CPT | Performed by: INTERNAL MEDICINE

## 2024-02-27 ASSESSMENT — ENCOUNTER SYMPTOMS
LOSS OF SENSATION IN FEET: 0
DEPRESSION: 0
OCCASIONAL FEELINGS OF UNSTEADINESS: 0

## 2024-02-27 ASSESSMENT — PATIENT HEALTH QUESTIONNAIRE - PHQ9
2. FEELING DOWN, DEPRESSED OR HOPELESS: NOT AT ALL
SUM OF ALL RESPONSES TO PHQ9 QUESTIONS 1 AND 2: 0
1. LITTLE INTEREST OR PLEASURE IN DOING THINGS: NOT AT ALL

## 2024-02-27 NOTE — PROGRESS NOTES
Referred by Damián Santos MD provider found for   Chief Complaint   Patient presents with    Atrial Fibrillation        Enrrique Velásquez is a 63 y.o. year old male patient with h/o PAF, s/p A fib RFA 2 years ago with recurrence last year requiring DCC and restart anticoagulation. There was question reg dehydration and sleep deprivation at the time of that event. Presents today for follow up.      PMHx/PSHx: As above    FamHx: unremarkable     Allergies:  Allergies   Allergen Reactions    Rosuvastatin Unknown and Other     Weakness in joints        Review of Systems    Constitutional: not feeling tired.   Eyes: no eyesight problems.   ENT: no hearing loss and no nosebleeds.   Cardiovascular: no intermittent leg claudication and as noted in HPI.   Respiratory: no chronic cough and no shortness of breath.   Gastrointestinal: no change in bowel habits and no blood in stools.   Genitourinary: no urinary frequency and no hematuria.   Skin: no skin rashes.   Neurological: no seizures and no frequent falls.   Psychiatric: no depression and not suicidal.   All other systems have been reviewed and are negative for complaint.     Outpatient Medications:  Current Outpatient Medications   Medication Instructions    acetaminophen (Tylenol) 325 mg tablet oral, Every 6 hours PRN    ALPRAZolam (XANAX) 0.25 mg, oral, Daily PRN    apixaban (Eliquis) 5 mg tablet TAKE 1 TABLET BY MOUTH TWO TIMES A DAY    apixaban (ELIQUIS) 5 mg, oral, 2 times daily    aspirin 81 mg EC tablet 1 tablet, oral, Daily    atorvastatin (LIPITOR) 80 mg, oral, Every other day    atorvastatin (LIPITOR) 80 mg, oral, Daily    benazepril (LOTENSIN) 40 mg, oral, Daily    carvedilol (COREG) 25 mg, oral, 2 times daily    cholecalciferol (Vitamin D-3) 25 MCG (1000 UT) capsule 1 capsule, oral, Daily    cyanocobalamin, vitamin B-12, 5,000 mcg tablet,disintegrating 1 tablet, oral, Daily    ezetimibe (ZETIA) 10 mg, oral, Daily    fish oil concentrate (Omega-3) 120-180 mg  "capsule 1 capsule, oral, Daily         Last Recorded Vitals:      2/14/2023     2:32 PM 7/27/2023     4:12 PM 8/22/2023     3:25 PM 8/29/2023     4:39 PM 10/31/2023     4:20 PM 2/20/2024     4:05 PM 2/27/2024     3:34 PM   Vitals   Systolic 128 124 162 140 130 140 165   Diastolic 81 79 94 90 80 80 95   Heart Rate 69 70 75 70 72 72 82   Temp  36.7 °C (98.1 °F)  36.5 °C (97.7 °F) 36.4 °C (97.6 °F) 36.8 °C (98.2 °F)    Resp    16 16 16    Height (in) 1.854 m (6' 1\")  1.854 m (6' 1\")    1.854 m (6' 1\")   Weight (lb) 261 262.2 262.25 259.4 259.4 264.2 262   BMI 34.43 kg/m2 34.59 kg/m2 34.6 kg/m2 34.22 kg/m2 34.22 kg/m2 34.86 kg/m2 34.57 kg/m2   BSA (m2) 2.47 m2 2.48 m2 2.48 m2 2.47 m2 2.47 m2 2.49 m2 2.48 m2   Visit Report  Report  Report Report Report Report    Visit Vitals  BP (!) 165/95 (BP Location: Left arm, Patient Position: Sitting, BP Cuff Size: Large adult)   Pulse 82   Ht 1.854 m (6' 1\")   Wt 119 kg (262 lb)   SpO2 95%   BMI 34.57 kg/m²   Smoking Status Former   BSA 2.48 m²        Physical Exam:  Constitutional: alert and in no acute distress.   Eyes: no erythema, swelling or discharge from the eye .   Neck: neck is supple, symmetric, trachea midline, no masses  and no thyromegaly .   Pulmonary: no increased work of breathing or signs of respiratory distress  and lungs clear to auscultation.    Cardiovascular: carotid pulses 2+ bilaterally with no bruit , JVP was normal, no thrills , regular rhythm, normal S1 and S2, no murmurs , pedal pulses 2+ bilaterally  and no edema .   Abdomen: abdomen non-tender, no masses  and no hepatomegaly .   Skin: skin warm and dry, normal skin turgor .   Psychiatric judgment and insight is normal  and oriented to person, place and time .        Assessment/Plan   Problem List Items Addressed This Visit             ICD-10-CM    Atrial fibrillation (CMS/HCC) - Primary I48.91    Relevant Orders    ECG 12 lead (Clinic Performed)       Enrrique Velásquez is a 63 y.o. year old male patient " with h/o PAF, s/p A fib RFA 2 years ago with recurrence last year requiring DCC and restart anticoagulation. There was question reg dehydration and sleep deprivation at the time of that event. Presents today for follow up.   The patient reprots doing well and denies symptoms including palpitations, chest pain or SOB. His current ECG shows NSR with narrow QRS and HR of 73 bpm. He claims that on occasions his HR increases to 140 bpm. I recommended a heart monitor although he states that it is not covered by his insurance and refers not to have one and stay on the Eliquis. Will follow up in 6 months.         Damián Moreno MD  Cardiac Electrophysiology      Thank you very much for allowing me to participate in the care of this pleasant patient. Please do not hesitate to contact me with any further questions or concerns regarding his care.    **Disclaimer: This note was dictated by speech recognition, and every effort has been made to prevent any error in transcription, however minor errors may be present**

## 2024-02-28 LAB
ATRIAL RATE: 73 BPM
P AXIS: 56 DEGREES
P OFFSET: 204 MS
P ONSET: 145 MS
PR INTERVAL: 152 MS
Q ONSET: 221 MS
QRS COUNT: 12 BEATS
QRS DURATION: 100 MS
QT INTERVAL: 382 MS
QTC CALCULATION(BAZETT): 420 MS
QTC FREDERICIA: 408 MS
R AXIS: -68 DEGREES
T AXIS: 73 DEGREES
T OFFSET: 412 MS
VENTRICULAR RATE: 73 BPM

## 2024-03-19 ENCOUNTER — OFFICE VISIT (OUTPATIENT)
Dept: CARDIOLOGY | Facility: CLINIC | Age: 64
End: 2024-03-19
Payer: COMMERCIAL

## 2024-03-19 ENCOUNTER — HOSPITAL ENCOUNTER (EMERGENCY)
Facility: HOSPITAL | Age: 64
Discharge: HOME | End: 2024-03-19
Attending: INTERNAL MEDICINE
Payer: COMMERCIAL

## 2024-03-19 ENCOUNTER — APPOINTMENT (OUTPATIENT)
Dept: RADIOLOGY | Facility: HOSPITAL | Age: 64
End: 2024-03-19
Payer: COMMERCIAL

## 2024-03-19 VITALS
HEIGHT: 73 IN | OXYGEN SATURATION: 98 % | BODY MASS INDEX: 34.33 KG/M2 | DIASTOLIC BLOOD PRESSURE: 86 MMHG | RESPIRATION RATE: 16 BRPM | TEMPERATURE: 98.6 F | HEART RATE: 86 BPM | SYSTOLIC BLOOD PRESSURE: 119 MMHG | WEIGHT: 259 LBS

## 2024-03-19 VITALS
HEIGHT: 73 IN | WEIGHT: 259 LBS | DIASTOLIC BLOOD PRESSURE: 113 MMHG | SYSTOLIC BLOOD PRESSURE: 140 MMHG | BODY MASS INDEX: 34.33 KG/M2 | HEART RATE: 160 BPM | OXYGEN SATURATION: 96 %

## 2024-03-19 DIAGNOSIS — I48.0 PAROXYSMAL ATRIAL FIBRILLATION (MULTI): Primary | ICD-10-CM

## 2024-03-19 DIAGNOSIS — I48.92 ATRIAL FLUTTER WITH RAPID VENTRICULAR RESPONSE (MULTI): Primary | ICD-10-CM

## 2024-03-19 LAB
ALBUMIN SERPL BCP-MCNC: 4.4 G/DL (ref 3.4–5)
ALP SERPL-CCNC: 82 U/L (ref 33–136)
ALT SERPL W P-5'-P-CCNC: 41 U/L (ref 10–52)
ANION GAP SERPL CALC-SCNC: 10 MMOL/L (ref 10–20)
APTT PPP: 33 SECONDS (ref 27–38)
AST SERPL W P-5'-P-CCNC: 24 U/L (ref 9–39)
ATRIAL RATE: 162 BPM
BASOPHILS # BLD AUTO: 0.06 X10*3/UL (ref 0–0.1)
BASOPHILS NFR BLD AUTO: 0.6 %
BILIRUB SERPL-MCNC: 0.8 MG/DL (ref 0–1.2)
BNP SERPL-MCNC: 194 PG/ML (ref 0–99)
BUN SERPL-MCNC: 16 MG/DL (ref 6–23)
CALCIUM SERPL-MCNC: 9.6 MG/DL (ref 8.6–10.3)
CHLORIDE SERPL-SCNC: 102 MMOL/L (ref 98–107)
CO2 SERPL-SCNC: 31 MMOL/L (ref 21–32)
CREAT SERPL-MCNC: 0.89 MG/DL (ref 0.5–1.3)
EGFRCR SERPLBLD CKD-EPI 2021: >90 ML/MIN/1.73M*2
EOSINOPHIL # BLD AUTO: 0.15 X10*3/UL (ref 0–0.7)
EOSINOPHIL NFR BLD AUTO: 1.6 %
ERYTHROCYTE [DISTWIDTH] IN BLOOD BY AUTOMATED COUNT: 13.2 % (ref 11.5–14.5)
GLUCOSE SERPL-MCNC: 159 MG/DL (ref 74–99)
HCT VFR BLD AUTO: 51.1 % (ref 41–52)
HGB BLD-MCNC: 16.9 G/DL (ref 13.5–17.5)
IMM GRANULOCYTES # BLD AUTO: 0.06 X10*3/UL (ref 0–0.7)
IMM GRANULOCYTES NFR BLD AUTO: 0.6 % (ref 0–0.9)
INR PPP: 1.4 (ref 0.9–1.1)
LYMPHOCYTES # BLD AUTO: 1.93 X10*3/UL (ref 1.2–4.8)
LYMPHOCYTES NFR BLD AUTO: 20.4 %
MAGNESIUM SERPL-MCNC: 1.9 MG/DL (ref 1.6–2.4)
MCH RBC QN AUTO: 28.7 PG (ref 26–34)
MCHC RBC AUTO-ENTMCNC: 33.1 G/DL (ref 32–36)
MCV RBC AUTO: 87 FL (ref 80–100)
MONOCYTES # BLD AUTO: 0.58 X10*3/UL (ref 0.1–1)
MONOCYTES NFR BLD AUTO: 6.1 %
NEUTROPHILS # BLD AUTO: 6.68 X10*3/UL (ref 1.2–7.7)
NEUTROPHILS NFR BLD AUTO: 70.7 %
NRBC BLD-RTO: 0 /100 WBCS (ref 0–0)
P AXIS: -12 DEGREES
P OFFSET: 202 MS
P ONSET: 159 MS
PLATELET # BLD AUTO: 316 X10*3/UL (ref 150–450)
POTASSIUM SERPL-SCNC: 4.5 MMOL/L (ref 3.5–5.3)
PR INTERVAL: 130 MS
PROT SERPL-MCNC: 7.2 G/DL (ref 6.4–8.2)
PROTHROMBIN TIME: 16 SECONDS (ref 9.8–12.8)
Q ONSET: 224 MS
QRS COUNT: 27 BEATS
QRS DURATION: 108 MS
QT INTERVAL: 238 MS
QTC CALCULATION(BAZETT): 390 MS
QTC FREDERICIA: 331 MS
R AXIS: -66 DEGREES
RBC # BLD AUTO: 5.89 X10*6/UL (ref 4.5–5.9)
SODIUM SERPL-SCNC: 138 MMOL/L (ref 136–145)
T AXIS: 86 DEGREES
T OFFSET: 343 MS
TSH SERPL-ACNC: 1.02 MIU/L (ref 0.44–3.98)
VENTRICULAR RATE: 162 BPM
WBC # BLD AUTO: 9.5 X10*3/UL (ref 4.4–11.3)

## 2024-03-19 PROCEDURE — 2500000002 HC RX 250 W HCPCS SELF ADMINISTERED DRUGS (ALT 637 FOR MEDICARE OP, ALT 636 FOR OP/ED): Performed by: INTERNAL MEDICINE

## 2024-03-19 PROCEDURE — 92960 CARDIOVERSION ELECTRIC EXT: CPT | Performed by: INTERNAL MEDICINE

## 2024-03-19 PROCEDURE — 99285 EMERGENCY DEPT VISIT HI MDM: CPT | Mod: 25

## 2024-03-19 PROCEDURE — 99152 MOD SED SAME PHYS/QHP 5/>YRS: CPT | Performed by: INTERNAL MEDICINE

## 2024-03-19 PROCEDURE — 84443 ASSAY THYROID STIM HORMONE: CPT | Performed by: INTERNAL MEDICINE

## 2024-03-19 PROCEDURE — 94681 O2 UPTK CO2 OUTP % O2 XTRC: CPT

## 2024-03-19 PROCEDURE — 85610 PROTHROMBIN TIME: CPT | Performed by: INTERNAL MEDICINE

## 2024-03-19 PROCEDURE — 36415 COLL VENOUS BLD VENIPUNCTURE: CPT | Performed by: INTERNAL MEDICINE

## 2024-03-19 PROCEDURE — 93005 ELECTROCARDIOGRAM TRACING: CPT | Performed by: INTERNAL MEDICINE

## 2024-03-19 PROCEDURE — 3080F DIAST BP >= 90 MM HG: CPT | Performed by: INTERNAL MEDICINE

## 2024-03-19 PROCEDURE — 99214 OFFICE O/P EST MOD 30 MIN: CPT | Mod: 25 | Performed by: INTERNAL MEDICINE

## 2024-03-19 PROCEDURE — 71045 X-RAY EXAM CHEST 1 VIEW: CPT | Mod: FOREIGN READ | Performed by: RADIOLOGY

## 2024-03-19 PROCEDURE — 83735 ASSAY OF MAGNESIUM: CPT | Performed by: INTERNAL MEDICINE

## 2024-03-19 PROCEDURE — 83880 ASSAY OF NATRIURETIC PEPTIDE: CPT | Performed by: INTERNAL MEDICINE

## 2024-03-19 PROCEDURE — 85025 COMPLETE CBC W/AUTO DIFF WBC: CPT | Performed by: INTERNAL MEDICINE

## 2024-03-19 PROCEDURE — 84460 ALANINE AMINO (ALT) (SGPT): CPT | Performed by: INTERNAL MEDICINE

## 2024-03-19 PROCEDURE — 2500000005 HC RX 250 GENERAL PHARMACY W/O HCPCS: Performed by: INTERNAL MEDICINE

## 2024-03-19 PROCEDURE — 3077F SYST BP >= 140 MM HG: CPT | Performed by: INTERNAL MEDICINE

## 2024-03-19 PROCEDURE — 2500000004 HC RX 250 GENERAL PHARMACY W/ HCPCS (ALT 636 FOR OP/ED)

## 2024-03-19 PROCEDURE — 2500000005 HC RX 250 GENERAL PHARMACY W/O HCPCS

## 2024-03-19 PROCEDURE — 96374 THER/PROPH/DIAG INJ IV PUSH: CPT | Mod: 59

## 2024-03-19 PROCEDURE — 71045 X-RAY EXAM CHEST 1 VIEW: CPT

## 2024-03-19 PROCEDURE — 1036F TOBACCO NON-USER: CPT | Performed by: INTERNAL MEDICINE

## 2024-03-19 PROCEDURE — 99291 CRITICAL CARE FIRST HOUR: CPT | Performed by: INTERNAL MEDICINE

## 2024-03-19 PROCEDURE — 99214 OFFICE O/P EST MOD 30 MIN: CPT | Performed by: INTERNAL MEDICINE

## 2024-03-19 RX ORDER — DILTIAZEM HYDROCHLORIDE 5 MG/ML
20 INJECTION INTRAVENOUS ONCE
Status: DISCONTINUED | OUTPATIENT
Start: 2024-03-19 | End: 2024-03-19

## 2024-03-19 RX ORDER — FENTANYL CITRATE 50 UG/ML
50 INJECTION, SOLUTION INTRAMUSCULAR; INTRAVENOUS ONCE
Status: COMPLETED | OUTPATIENT
Start: 2024-03-19 | End: 2024-03-19

## 2024-03-19 RX ORDER — ETOMIDATE 2 MG/ML
INJECTION INTRAVENOUS
Status: COMPLETED
Start: 2024-03-19 | End: 2024-03-19

## 2024-03-19 RX ORDER — AMIODARONE HYDROCHLORIDE 200 MG/1
TABLET ORAL
Qty: 56 TABLET | Refills: 2 | Status: SHIPPED | OUTPATIENT
Start: 2024-03-19 | End: 2024-03-26 | Stop reason: DRUGHIGH

## 2024-03-19 RX ORDER — DILTIAZEM HYDROCHLORIDE 5 MG/ML
20 INJECTION INTRAVENOUS ONCE
Status: COMPLETED | OUTPATIENT
Start: 2024-03-19 | End: 2024-03-19

## 2024-03-19 RX ORDER — ETOMIDATE 2 MG/ML
10 INJECTION INTRAVENOUS ONCE
Status: COMPLETED | OUTPATIENT
Start: 2024-03-19 | End: 2024-03-19

## 2024-03-19 RX ORDER — AMIODARONE HYDROCHLORIDE 200 MG/1
400 TABLET ORAL ONCE
Status: COMPLETED | OUTPATIENT
Start: 2024-03-19 | End: 2024-03-19

## 2024-03-19 RX ORDER — FENTANYL CITRATE 50 UG/ML
INJECTION, SOLUTION INTRAMUSCULAR; INTRAVENOUS
Status: COMPLETED
Start: 2024-03-19 | End: 2024-03-19

## 2024-03-19 RX ADMIN — FENTANYL CITRATE 50 MCG: 50 INJECTION, SOLUTION INTRAMUSCULAR; INTRAVENOUS at 17:03

## 2024-03-19 RX ADMIN — AMIODARONE HYDROCHLORIDE 400 MG: 200 TABLET ORAL at 12:00

## 2024-03-19 RX ADMIN — ETOMIDATE 10 MG: 2 INJECTION INTRAVENOUS at 17:03

## 2024-03-19 RX ADMIN — FENTANYL CITRATE 50 MCG: 50 INJECTION INTRAMUSCULAR; INTRAVENOUS at 17:03

## 2024-03-19 RX ADMIN — ETOMIDATE 10 MG: 2 INJECTION, SOLUTION INTRAVENOUS at 17:03

## 2024-03-19 RX ADMIN — DILTIAZEM HYDROCHLORIDE 20 MG: 5 INJECTION INTRAVENOUS at 12:51

## 2024-03-19 RX ADMIN — Medication: at 15:30

## 2024-03-19 ASSESSMENT — PAIN SCALES - GENERAL
PAINLEVEL_OUTOF10: 0 - NO PAIN
PAINLEVEL: 5

## 2024-03-19 ASSESSMENT — ENCOUNTER SYMPTOMS
OCCASIONAL FEELINGS OF UNSTEADINESS: 0
LOSS OF SENSATION IN FEET: 0
DEPRESSION: 0

## 2024-03-19 ASSESSMENT — PATIENT HEALTH QUESTIONNAIRE - PHQ9
SUM OF ALL RESPONSES TO PHQ9 QUESTIONS 1 AND 2: 0
1. LITTLE INTEREST OR PLEASURE IN DOING THINGS: NOT AT ALL
2. FEELING DOWN, DEPRESSED OR HOPELESS: NOT AT ALL

## 2024-03-19 ASSESSMENT — PAIN - FUNCTIONAL ASSESSMENT: PAIN_FUNCTIONAL_ASSESSMENT: 0-10

## 2024-03-19 ASSESSMENT — ACTIVITIES OF DAILY LIVING (ADL): LACK_OF_TRANSPORTATION: NO

## 2024-03-19 NOTE — ED PROVIDER NOTES
HPI     CC: Rapid Heart Rate (Pt presents from urgent care with tachycardia that began today. Pt reports a history of a-fib with rvr and is on thinners for this. Pt denies cp, but endorses sob with exertion. Pt has no further complaints at this time. )     HPI: Enrrique Velásquez is a 63 y.o. male with a history of PAF on Eliquis, s/p RFA 2 years ago, DCCV 8/2023, HTN, HLD, and anxiety, presents with possible atrial flutter with RVR.  Patient states that 2 mornings ago he woke up feeling like his heart was racing.  His Apple watch confirmed that his heart rate was varying from the 110s to the 150s.  He has had associated exertional dyspnea and discomfort in the right side of his back and bilateral arms.  Denies any overt chest pain, leg pain or swelling.  He has been under some stressors of late.  He is 100% compliant with his Eliquis.    ROS: 10-point review of systems was performed and is otherwise negative except as noted in HPI.    Limitations to history: N/A    Independent Historians: N/A    External Records Reviewed: N/A    Past Medical History: Noncontributory except per HPI     Past Surgical History: Noncontributory except per HPI     Family History: Reviewed and noncontributory     Social History:  Denies tobacco. Denies ETOH. Denies illicit drugs.    Social Determinants Affecting Care: N/A    Allergies   Allergen Reactions    Rosuvastatin Unknown and Other     Weakness in joints       Home Meds:   Current Outpatient Medications   Medication Instructions    acetaminophen (Tylenol) 325 mg tablet oral, Every 6 hours PRN    ALPRAZolam (XANAX) 0.25 mg, oral, Daily PRN    amiodarone (Pacerone) 200 mg tablet Take 2 tablets (400 mg) by mouth 2 times a day for 7 days, THEN 2 tablets (400 mg) once daily for 7 days, THEN 1 tablet (200 mg) once daily for 14 days.    apixaban (Eliquis) 5 mg tablet TAKE 1 TABLET BY MOUTH TWO TIMES A DAY    apixaban (ELIQUIS) 5 mg, oral, 2 times daily    aspirin 81 mg EC tablet 1 tablet,  "oral, Daily    atorvastatin (LIPITOR) 80 mg, oral, Every other day    benazepril (LOTENSIN) 40 mg, oral, Daily    carvedilol (COREG) 25 mg, oral, 2 times daily    cholecalciferol (Vitamin D-3) 25 MCG (1000 UT) capsule 1 capsule, oral, Daily    cyanocobalamin, vitamin B-12, 5,000 mcg tablet,disintegrating 1 tablet, oral, Daily    ezetimibe (ZETIA) 10 mg, oral, Daily    fish oil concentrate (Omega-3) 120-180 mg capsule 1 capsule, oral, Daily        Physical Exam     ED Triage Vitals   Temperature Heart Rate Respirations BP   03/19/24 1138 03/19/24 1138 03/19/24 1138 03/19/24 1138   37 °C (98.6 °F) (!) 164 18 (!) 176/100      Pulse Ox Temp src Heart Rate Source Patient Position   03/19/24 1138 -- 03/19/24 1202 03/19/24 1202   98 %  Monitor Lying      BP Location FiO2 (%)     03/19/24 1202 --     Right arm          Heart Rate:  []   Temperature:  [37 °C (98.6 °F)]   Respirations:  [10-27]   BP: ()/()   Height:  [185.4 cm (6' 0.99\")-185.4 cm (6' 1\")]   Weight:  [117 kg (259 lb)]   Pulse Ox:  [93 %-98 %]      Physical Exam  Vitals and nursing note reviewed.     CONSTITUTIONAL: Well appearing, well nourished, in no acute distress.   HENMT: Head atraumatic. Airway patent. Nasal mucosa clear. Mouth with normal mucosa, clear oropharynx. Uvula midline. Neck supple.    EYES: Clear bilaterally, pupils equally round and reactive to light.   CARDIOVASCULAR: Tachycardic, regular rhythm.  Heart sounds S1, S2.  No murmurs, rubs or gallops. Normal pulses. Capillary refill < 2 sec.   RESPIRATORY: No increased work of breathing. Breath sounds clear and equal bilaterally.  GASTROINTESTINAL: Abdomen soft, non-distended, non-tender. No rebound, no guarding. Normal bowel sounds. No palpable masses.  GENITOURINARY:  No CVA tenderness.  MUSCULOSKELETAL: Spine appears normal, range of motion is not limited, no muscle or joint tenderness. No edema.   NEUROLOGICAL: Alert and oriented, no asymmetry, moving all extremities " equally.  SKIN: Warm, dry and intact. No rash or notable lesions.  PSYCHIATRIC: Normal mood and affect.  HEME/LYMPH: No adenopathy or splenomegaly.    Diagnostic Results      ECG: ECGs read and interpreted by me.  Sinus tachycardia versus atrial flutter with RVR, rate 162.  Incomplete RBBB.  LAFB.  No significant ST or T wave derangements.    Labs Reviewed   COMPREHENSIVE METABOLIC PANEL - Abnormal       Result Value    Glucose 159 (*)     Sodium 138      Potassium 4.5      Chloride 102      Bicarbonate 31      Anion Gap 10      Urea Nitrogen 16      Creatinine 0.89      eGFR >90      Calcium 9.6      Albumin 4.4      Alkaline Phosphatase 82      Total Protein 7.2      AST 24      Bilirubin, Total 0.8      ALT 41     B-TYPE NATRIURETIC PEPTIDE - Abnormal     (*)     Narrative:        <100 pg/mL - Heart failure unlikely  100-299 pg/mL - Intermediate probability of acute heart                  failure exacerbation. Correlate with clinical                  context and patient history.    >=300 pg/mL - Heart Failure likely. Correlate with clinical                  context and patient history.    BNP testing is performed using different testing methodology at [facility] than at other [facility]. Direct result comparisons should only be made within the same method.      COAGULATION SCREEN - Abnormal    Protime 16.0 (*)     INR 1.4 (*)     aPTT 33      Narrative:     The APTT is no longer used for monitoring Unfractionated Heparin Therapy. For monitoring Heparin Therapy, use the Heparin Assay.   MAGNESIUM - Normal    Magnesium 1.90     TSH WITH REFLEX TO FREE T4 IF ABNORMAL - Normal    Thyroid Stimulating Hormone 1.02      Narrative:     TSH testing is performed using different testing methodology at [facility] than at other [facility]. Direct result comparisons should only be made within the same method.     CBC WITH AUTO DIFFERENTIAL    WBC 9.5      nRBC 0.0      RBC 5.89       Hemoglobin 16.9      Hematocrit 51.1      MCV 87      MCH 28.7      MCHC 33.1      RDW 13.2      Platelets 316      Neutrophils % 70.7      Immature Granulocytes %, Automated 0.6      Lymphocytes % 20.4      Monocytes % 6.1      Eosinophils % 1.6      Basophils % 0.6      Neutrophils Absolute 6.68      Immature Granulocytes Absolute, Automated 0.06      Lymphocytes Absolute 1.93      Monocytes Absolute 0.58      Eosinophils Absolute 0.15      Basophils Absolute 0.06           XR chest 1 view   Final Result   No radiographic evidence of acute cardiopulmonary disease.   Signed by Alexsander Adan MD                    Abhishek Coma Scale Score: 15                  Procedure  Moderate Sedation    Performed by: Loraine Ceron MD  Authorized by: Loraine Ceron MD    Consent:     Consent obtained:  Written    Consent given by:  Patient    Risks, benefits, and alternatives were discussed: yes    Universal protocol:     Procedure explained and questions answered to patient or proxy's satisfaction: yes      Patient identity confirmed:  Arm band and verbally with patient  Indications:     Procedure performed:  Cardioversion    Procedure necessitating sedation performed by:  Physician performing sedation    Intended level of sedation:  Moderate  Pre-sedation assessment:     Time since last food or drink:  6 hours    ASA classification: class 2 - patient with mild systemic disease      Mouth opening:  3 or more finger widths    Thyromental distance:  4 finger widths    Mallampati score:  II - soft palate, uvula, fauces visible    Neck mobility: normal      Pre-sedation assessments completed and reviewed: airway patency, anesthesia/sedation history, cardiovascular function, hydration status, mental status, nausea/vomiting, pain level, respiratory function and temperature      History of difficult intubation: no    Immediate pre-procedure details:     Reassessment: Patient reassessed immediately prior to procedure       Reviewed: vital signs, relevant labs/tests and NPO status      Verified: bag valve mask available, emergency equipment available, intubation equipment available, IV patency confirmed, oxygen available, reversal medications available and suction available    Procedure details (see MAR for exact dosages):     Sedation start time:  3/19/2024 3:00 PM    Preoxygenation:  Nasal cannula    Sedation:  Etomidate    Analgesia:  Fentanyl    Intra-procedure monitoring:  Blood pressure monitoring, continuous capnometry, frequent LOC assessments, frequent vital sign checks, continuous pulse oximetry and cardiac monitor    Intra-procedure events: none      Sedation end time:  3/19/2024 3:15 PM    Total sedation time (minutes):  15  Post-procedure details:     Attendance: Constant attendance by certified staff until patient recovered      Recovery: Patient returned to pre-procedure baseline      Estimated blood loss (see I/O flowsheets): no      Complications:  None    Post-sedation assessments completed and reviewed: airway patency, cardiovascular function, hydration status, mental status, nausea/vomiting, pain level, respiratory function and temperature      Specimens recovered:  None    Patient is stable for discharge or admission: yes      Procedure completion:  Tolerated well, no immediate complications  Electrical Cardioversion    Performed by: Loraine Ceron MD  Authorized by: Loraine Ceron MD    Consent:     Consent obtained:  Written    Risks, benefits, and alternatives were discussed: yes    Universal protocol:     Procedure explained and questions answered to patient or proxy's satisfaction: yes      Patient identity confirmed:  Verbally with patient and arm band  Pre-procedure details:     Cardioversion basis:  Elective    Rhythm:  Atrial flutter    Electrode placement:  Anterior-lateral  Attempt one:     Cardioversion mode:  Synchronous    Waveform:  Biphasic    Shock (Joules):  150    Shock outcome:   Conversion to normal sinus rhythm  Post-procedure details:     Post-procedure mental status: sedated.    Procedure completion:  Tolerated well, no immediate complications  Critical Care    Performed by: Loraine Ceron MD  Authorized by: Loraine Ceron MD    Critical care provider statement:     Critical care time (minutes):  35    Critical care time was exclusive of:  Separately billable procedures and treating other patients and teaching time    Critical care was necessary to treat or prevent imminent or life-threatening deterioration of the following conditions:  Circulatory failure    Critical care was time spent personally by me on the following activities:  Development of treatment plan with patient or surrogate, discussions with consultants, evaluation of patient's response to treatment, examination of patient, obtaining history from patient or surrogate, ordering and performing treatments and interventions, ordering and review of laboratory studies, ordering and review of radiographic studies, pulse oximetry, re-evaluation of patient's condition and review of old charts    Care discussed with: admitting provider        ED Course & MDM   Assessment/Plan:   Enrrique Velásquez is a 63 y.o. male with a history of PAF on Eliquis, s/p RFA 2 years ago, DCCV 8/2023, HTN, HLD, and anxiety, presents with atrial flutter with RVR.  ECG shows atrial flutter, rate 162.  Workup was initiated with labs, chest x-ray to rule out precipitating etiologies.  I spoke with Dr. Moreno from EP who sent the patient in.  He is recommending likely DCCV and likely discharge with amiodarone load.  Will trial rate control first to see if he spontaneously converts.  Treatment was initiated with diltiazem 20 mg IV and p.o. amiodarone. See below for details of ED course and ultimate disposition.    Medications   amiodarone (Pacerone) tablet 400 mg (400 mg oral Given 3/19/24 1200)   dilTIAZem (Cardizem) injection 20 mg (20 mg intravenous  Given 3/19/24 1251)   fentaNYL PF (Sublimaze) injection 50 mcg (50 mcg intravenous Given 3/19/24 1703)   etomidate (Amidate) injection 10 mg (10 mg intravenous Given 3/19/24 1703)        ED Course as of 03/19/24 2254 Tue Mar 19, 2024   1516 Labs are largely reassuring with normal CBC, normal CMP, mildly elevated , normal magnesium, mildly prolonged coags consistent with patient's known anticoagulation, normal TSH.  Patient's heart rate improved to the 110s after diltiazem, repeat ECG still shows atrial flutter.  Patient has opted to pursue DCCV.  Patient successfully cardioverted with 150 J using fentanyl and etomidate.  No complications to the procedure.  He was observed for over an hour following the procedure and returned to his baseline, feeling better.  He was given a prescription for an amiodarone load per Dr. Moreno's recommendations.  He will follow-up with his office this week.  Patient was discharged home with anticipatory guidance and strict return precautions. [CG]      ED Course User Index  [CG] Loraine Ceron MD         Diagnoses as of 03/19/24 2254   Atrial flutter with rapid ventricular response (CMS/HCC)       Disposition:   DISCHARGE.  The patient was discharged with both verbal and written anticipatory guidance and strict return precautions. Discharge diagnosis, instructions and plan were discussed and understood. I emphasized the importance of following up with their primary care provider within 24-48 hours and with any referrals in the timeframe recommended. At the time of discharge the patient was comfortable and was in no apparent distress. Patient is aware of diagnostic uncertainty and was notified though testing is negative here, there is a very small chance that pathology may be missed.  The patient understands these risks and the patient/family understood to call 911 or return immediately to the emergency department if the symptoms worsen or if they have any additional  concerns.      FOLLOW UP  Primary care provider in 1-2 days.      ED Prescriptions       Medication Sig Dispense Start Date End Date Auth. Provider    amiodarone (Pacerone) 200 mg tablet Take 2 tablets (400 mg) by mouth 2 times a day for 7 days, THEN 2 tablets (400 mg) once daily for 7 days, THEN 1 tablet (200 mg) once daily for 14 days. 56 tablet 3/19/2024 4/16/2024 MD Loraine Miles MD  EM/IM/Peds    This note was dictated by speech recognition. Minor errors in transcription may be present.     Loraine Ceron MD  04/06/24 3203     [Right] : right foot and ankle [Mild] : mild diffused ankle swelling [NL (40)] : plantar flexion 40 degrees [NL 30)] : inversion 30 degrees [NL (20)] : eversion 20 degrees [5___] : [facility] 5[unfilled]/5 [2+] : posterior tibialis pulse: 2+ [Normal] : saphenous nerve sensation normal [NL (0)] : extension 0 degrees [Bilateral] : knee bilaterally [AP] : anteroposterior [Lateral] : lateral [[place]] : skyline [Degenerative change] : Degenerative change [TWNoteComboBox7] : flexion 125 degrees [] : no pain when stressing lateral tarsal metatarsal joint [FreeTextEntry3] : Arch height slightly lower on RT compared to LT.  [FreeTextEntry8] : There is medial hind foot tenderness.

## 2024-03-19 NOTE — DISCHARGE INSTRUCTIONS
You were seen today for atrial flutter with rapid ventricular response.  We cardioverted you in the emergency department.  Dr. Moreno wants to start you on amiodarone which we have prescribed. Follow up with him for further management. Your evaluation was not concerning for an emergency at this time. Please see the attached information sheet for information about your condition, how to care for your condition at home, and reasons to return to the emergency department. Take any prescriptions written today as prescribed. You should call your primary care provider within 24 hours to tell them about today's visit, including any new medications or medication changes, as he or she may want to see you in the office for further evaluation. If you do not have a primary care provider, call  [phone] for an appointment. We offer in-person office visits as well as virtual options. Please do not hesitate to call  [phone] or return to the emergency department with any new or unresolved concerns or symptoms. Thank you for choosing [facility] for your care.

## 2024-03-19 NOTE — PROGRESS NOTES
Home with wife      03/19/24 7353   Current Planned Discharge Disposition   Current Planned Discharge Disposition Home

## 2024-03-19 NOTE — PROGRESS NOTES
Transitional Care Coordination Progress Note:  Plan per Medical/Surgical team: treatment of afiv RVR with amiodarone & IV cardizem  Status: ED  Payor source: [place]   Discharge disposition: Home with wife   Potential Barriers: ?anticoagulation plan   to 96, /100 to 106/69  ADOD: 3/21/2024  ALLIE Devine RN, BSN Transitional Care Coordinator ED# [phone]      03/19/24 1355   Discharge Planning   Living Arrangements Spouse/significant other   Support Systems Spouse/significant other   Assistance Needed cardio work up   Type of Residence Private residence   Number of Stairs to Enter Residence 2   Number of Stairs Within Residence 12   Home or Post Acute Services None   Patient expects to be discharged to: Home with wife   Does the patient need discharge transport arranged? Yes   RoundTrip coordination needed? Yes   Has discharge transport been arranged? No   Financial Resource Strain   How hard is it for you to pay for the very basics like food, housing, medical care, and heating? Not hard   Housing Stability   In the last 12 months, was there a time when you were not able to pay the mortgage or rent on time? N   In the last 12 months, how many places have you lived? 1   In the last 12 months, was there a time when you did not have a steady place to sleep or slept in a shelter (including now)? N   Transportation Needs   In the past 12 months, has lack of transportation kept you from medical appointments or from getting medications? no   In the past 12 months, has lack of transportation kept you from meetings, work, or from getting things needed for daily living? No

## 2024-03-19 NOTE — PROGRESS NOTES
03/19/24 1354   [facility] Disability Status   Are you deaf or do you have serious difficulty hearing? N   Are you blind or do you have serious difficulty seeing, even when wearing glasses? N   Because of a physical, mental, or emotional condition, do you have serious difficulty concentrating, remembering, or making decisions? (5 years old or older) N   Do you have serious difficulty walking or climbing stairs? N   Do you have serious difficulty dressing or bathing? N   Because of a physical, mental, or emotional condition, do you have serious difficulty doing errands alone such as visiting the doctor? N

## 2024-03-19 NOTE — PROGRESS NOTES
Referred by Dr. Vela ref. provider found provider found for   Chief Complaint   Patient presents with    Atrial Fibrillation        Enrrique Velásquez is a 63 y.o. year old male patient with h/o PAF s/p RFA over 2 years ago. Was recently seen in clinic and found to be doing well. Now the patient experienced palpitations and SOB for the last 3 days. Presents for evaluation.     PMHx/PSHx: As above    FamHx: unremarkable     Allergies:  Allergies   Allergen Reactions    Rosuvastatin Unknown and Other     Weakness in joints        Review of Systems    Constitutional: not feeling tired.   Eyes: no eyesight problems.   ENT: no hearing loss and no nosebleeds.   Cardiovascular: no intermittent leg claudication and as noted in HPI.   Respiratory: no chronic cough and no shortness of breath.   Gastrointestinal: no change in bowel habits and no blood in stools.   Genitourinary: no urinary frequency and no hematuria.   Skin: no skin rashes.   Neurological: no seizures and no frequent falls.   Psychiatric: no depression and not suicidal.   All other systems have been reviewed and are negative for complaint.     Outpatient Medications:  Current Outpatient Medications   Medication Instructions    acetaminophen (Tylenol) 325 mg tablet oral, Every 6 hours PRN    ALPRAZolam (XANAX) 0.25 mg, oral, Daily PRN    apixaban (Eliquis) 5 mg tablet TAKE 1 TABLET BY MOUTH TWO TIMES A DAY    apixaban (ELIQUIS) 5 mg, oral, 2 times daily    aspirin 81 mg EC tablet 1 tablet, oral, Daily    atorvastatin (LIPITOR) 80 mg, oral, Every other day    benazepril (LOTENSIN) 40 mg, oral, Daily    carvedilol (COREG) 25 mg, oral, 2 times daily    cholecalciferol (Vitamin D-3) 25 MCG (1000 UT) capsule 1 capsule, oral, Daily    cyanocobalamin, vitamin B-12, 5,000 mcg tablet,disintegrating 1 tablet, oral, Daily    ezetimibe (ZETIA) 10 mg, oral, Daily    fish oil concentrate (Omega-3) 120-180 mg capsule 1 capsule, oral, Daily         Last Recorded Vitals:       "7/27/2023     4:12 PM 8/22/2023     3:25 PM 8/29/2023     4:39 PM 10/31/2023     4:20 PM 2/20/2024     4:05 PM 2/27/2024     3:34 PM 3/19/2024    11:00 AM   Vitals   Systolic 124 162 140 130 140 165 140   Diastolic 79 94 90 80 80 95 113   Heart Rate 70 75 70 72 72 82 160   Temp 36.7 °C (98.1 °F)  36.5 °C (97.7 °F) 36.4 °C (97.6 °F) 36.8 °C (98.2 °F)     Resp   16 16 16     Height (in)  1.854 m (6' 1\")    1.854 m (6' 1\") 1.854 m (6' 1\")   Weight (lb) 262.2 262.25 259.4 259.4 264.2 262 259   BMI 34.59 kg/m2 34.6 kg/m2 34.22 kg/m2 34.22 kg/m2 34.86 kg/m2 34.57 kg/m2 34.17 kg/m2   BSA (m2) 2.48 m2 2.48 m2 2.47 m2 2.47 m2 2.49 m2 2.48 m2 2.45 m2   Visit Report Report  Report Report Report Report Report    Visit Vitals  BP (!) 140/113 (BP Location: Left arm, Patient Position: Sitting, BP Cuff Size: Large adult)   Pulse (!) 160   Ht 1.854 m (6' 1\")   Wt 117 kg (259 lb)   SpO2 96%   BMI 34.17 kg/m²   Smoking Status Former   BSA 2.45 m²        Physical Exam:  Constitutional: alert and in no acute distress.   Eyes: no erythema, swelling or discharge from the eye .   Neck: neck is supple, symmetric, trachea midline, no masses  and no thyromegaly .   Pulmonary: no increased work of breathing or signs of respiratory distress  and lungs clear to auscultation.    Cardiovascular: carotid pulses 2+ bilaterally with no bruit , JVP was normal, no thrills , regular rhythm, variable S1 and normalS2, no murmurs , pedal pulses 2+ bilaterally  and no edema .   Abdomen: abdomen non-tender, no masses  and no hepatomegaly .   Skin: skin warm and dry, normal skin turgor .   Psychiatric judgment and insight is normal  and oriented to person, place and time .        Assessment/Plan   Problem List Items Addressed This Visit             ICD-10-CM    Atrial fibrillation (CMS/HCC) - Primary I48.91       Enrrique Velásquez is a 63 y.o. year old male patient with h/o PAF s/p RFA over 2 years ago. Was recently seen in clinic and found to be doing well. Now " the patient experienced palpitations and SOB for the last 3 days. Presents for evaluation.   His current ECG shows he is in Atypical A flutter with RVR in the 160's bpm. He is otherwise stable. Will refer him to the [facility] ER for further treatment with possible DCC and amio load. I called and discussed plan with ER attending.         Damián Moreno MD  Cardiac Electrophysiology      Thank you very much for allowing me to participate in the care of this pleasant patient. Please do not hesitate to contact me with any further questions or concerns regarding his care.    **Disclaimer: This note was dictated by speech recognition, and every effort has been made to prevent any error in transcription, however minor errors may be present**

## 2024-03-20 LAB
ATRIAL RATE: 161 BPM
P OFFSET: 199 MS
P ONSET: 179 MS
PR INTERVAL: 86 MS
Q ONSET: 222 MS
QRS COUNT: 26 BEATS
QRS DURATION: 106 MS
QT INTERVAL: 244 MS
QTC CALCULATION(BAZETT): 399 MS
QTC FREDERICIA: 339 MS
R AXIS: -66 DEGREES
T AXIS: 69 DEGREES
T OFFSET: 344 MS
VENTRICULAR RATE: 161 BPM

## 2024-03-26 ENCOUNTER — OFFICE VISIT (OUTPATIENT)
Dept: CARDIOLOGY | Facility: CLINIC | Age: 64
End: 2024-03-26
Payer: COMMERCIAL

## 2024-03-26 VITALS
SYSTOLIC BLOOD PRESSURE: 148 MMHG | BODY MASS INDEX: 34.33 KG/M2 | HEIGHT: 73 IN | HEART RATE: 61 BPM | DIASTOLIC BLOOD PRESSURE: 89 MMHG | OXYGEN SATURATION: 95 % | WEIGHT: 259 LBS

## 2024-03-26 DIAGNOSIS — I48.0 PAROXYSMAL ATRIAL FIBRILLATION (MULTI): Primary | ICD-10-CM

## 2024-03-26 PROCEDURE — 93005 ELECTROCARDIOGRAM TRACING: CPT | Performed by: INTERNAL MEDICINE

## 2024-03-26 PROCEDURE — 1036F TOBACCO NON-USER: CPT | Performed by: INTERNAL MEDICINE

## 2024-03-26 PROCEDURE — 3079F DIAST BP 80-89 MM HG: CPT | Performed by: INTERNAL MEDICINE

## 2024-03-26 PROCEDURE — 3077F SYST BP >= 140 MM HG: CPT | Performed by: INTERNAL MEDICINE

## 2024-03-26 PROCEDURE — 99214 OFFICE O/P EST MOD 30 MIN: CPT | Performed by: INTERNAL MEDICINE

## 2024-03-26 RX ORDER — AMIODARONE HYDROCHLORIDE 200 MG/1
200 TABLET ORAL DAILY
Qty: 90 TABLET | Refills: 3 | Status: SHIPPED | OUTPATIENT
Start: 2024-03-26 | End: 2024-04-15

## 2024-03-26 ASSESSMENT — ENCOUNTER SYMPTOMS
DEPRESSION: 0
OCCASIONAL FEELINGS OF UNSTEADINESS: 0
LOSS OF SENSATION IN FEET: 0

## 2024-03-26 ASSESSMENT — PATIENT HEALTH QUESTIONNAIRE - PHQ9
1. LITTLE INTEREST OR PLEASURE IN DOING THINGS: NOT AT ALL
SUM OF ALL RESPONSES TO PHQ9 QUESTIONS 1 AND 2: 0
2. FEELING DOWN, DEPRESSED OR HOPELESS: NOT AT ALL

## 2024-03-26 ASSESSMENT — PAIN SCALES - GENERAL: PAINLEVEL: 0-NO PAIN

## 2024-03-26 NOTE — PROGRESS NOTES
Referred by Damián Santos MD provider found for   Chief Complaint   Patient presents with    Atrial Fibrillation        Enrrique Velásquez is a 63 y.o. year old male patient with h/o A Fib s/p RFA 2 years ago. Was seen last week in clinic and found t be in atypica A flutter. Was sent to the ER where a DCC was performed and was discharged on amiodarone load. Presents for follow up.      PMHx/PSHx: As above    FamHx: unremarkable     Allergies:  Allergies   Allergen Reactions    Rosuvastatin Unknown and Other     Weakness in joints        Review of Systems    Constitutional: not feeling tired.   Eyes: no eyesight problems.   ENT: no hearing loss and no nosebleeds.   Cardiovascular: no intermittent leg claudication and as noted in HPI.   Respiratory: no chronic cough and no shortness of breath.   Gastrointestinal: no change in bowel habits and no blood in stools.   Genitourinary: no urinary frequency and no hematuria.   Skin: no skin rashes.   Neurological: no seizures and no frequent falls.   Psychiatric: no depression and not suicidal.   All other systems have been reviewed and are negative for complaint.     Outpatient Medications:  Current Outpatient Medications   Medication Instructions    acetaminophen (Tylenol) 325 mg tablet oral, Every 6 hours PRN    ALPRAZolam (XANAX) 0.25 mg, oral, Daily PRN    amiodarone (Pacerone) 200 mg tablet Take 2 tablets (400 mg) by mouth 2 times a day for 7 days, THEN 2 tablets (400 mg) once daily for 7 days, THEN 1 tablet (200 mg) once daily for 14 days.    apixaban (Eliquis) 5 mg tablet TAKE 1 TABLET BY MOUTH TWO TIMES A DAY    apixaban (ELIQUIS) 5 mg, oral, 2 times daily    aspirin 81 mg EC tablet 1 tablet, oral, Daily    atorvastatin (LIPITOR) 80 mg, oral, Every other day    benazepril (LOTENSIN) 40 mg, oral, Daily    carvedilol (COREG) 25 mg, oral, 2 times daily    cholecalciferol (Vitamin D-3) 25 MCG (1000 UT) capsule 1 capsule, oral, Daily    cyanocobalamin, vitamin B-12,  "5,000 mcg tablet,disintegrating 1 tablet, oral, Daily    ezetimibe (ZETIA) 10 mg, oral, Daily    fish oil concentrate (Omega-3) 120-180 mg capsule 1 capsule, oral, Daily         Last Recorded Vitals:      3/19/2024     3:12 PM 3/19/2024     3:30 PM 3/19/2024     3:45 PM 3/19/2024     4:00 PM 3/19/2024     4:30 PM 3/19/2024     4:45 PM 3/26/2024     1:11 PM   Vitals   Systolic 119 110 115 116 108 119 148   Diastolic 96 90 90 90 78 86 89   Heart Rate 78 79 79 78 77 86 61   Resp 19 14 10 14 18 16    Height (in)       1.854 m (6' 1\")   Weight (lb)       259   BMI       34.17 kg/m2   BSA (m2)       2.45 m2   Visit Report Report Report Report Report Report Report Report    Visit Vitals  /89 (BP Location: Left arm, Patient Position: Sitting, BP Cuff Size: Adult)   Pulse 61   Ht 1.854 m (6' 1\")   Wt 117 kg (259 lb)   SpO2 95%   BMI 34.17 kg/m²   Smoking Status Former   BSA 2.45 m²        Physical Exam:  Constitutional: alert and in no acute distress.   Eyes: no erythema, swelling or discharge from the eye .   Neck: neck is supple, symmetric, trachea midline, no masses  and no thyromegaly .   Pulmonary: no increased work of breathing or signs of respiratory distress  and lungs clear to auscultation.    Cardiovascular: carotid pulses 2+ bilaterally with no bruit , JVP was normal, no thrills , regular rhythm, normal S1 and S2, no murmurs , pedal pulses 2+ bilaterally  and no edema .   Abdomen: abdomen non-tender, no masses  and no hepatomegaly .   Skin: skin warm and dry, normal skin turgor .   Psychiatric judgment and insight is normal  and oriented to person, place and time .        Assessment/Plan   Problem List Items Addressed This Visit             ICD-10-CM    Atrial fibrillation (CMS/HCC) - Primary I48.91    Relevant Orders    ECG 12 lead (Clinic Performed)       Enrrique Velásquez is a 63 y.o. year old male patient with h/o A Fib s/p RFA 2 years ago. Was seen last week in clinic and found t be in atypica A flutter. " Was sent to the ER where a DCC was performed and was discharged on amiodarone load. Presents for follow up.    His current ECG shows NSR with narrow QRS and HR of 64 bpm. Will continue his current medications and follow up in 6 months. If he remains in NSR then will hold amiodarone, otherwise if he experiences recurrence then will proceed with redo RFA.         Damián Moreno MD  Cardiac Electrophysiology      Thank you very much for allowing me to participate in the care of this pleasant patient. Please do not hesitate to contact me with any further questions or concerns regarding his care.    **Disclaimer: This note was dictated by speech recognition, and every effort has been made to prevent any error in transcription, however minor errors may be present**

## 2024-03-28 LAB
ATRIAL RATE: 64 BPM
P AXIS: 53 DEGREES
P OFFSET: 198 MS
P ONSET: 138 MS
PR INTERVAL: 162 MS
Q ONSET: 219 MS
QRS COUNT: 10 BEATS
QRS DURATION: 110 MS
QT INTERVAL: 432 MS
QTC CALCULATION(BAZETT): 445 MS
QTC FREDERICIA: 441 MS
R AXIS: -51 DEGREES
T AXIS: 75 DEGREES
T OFFSET: 435 MS
VENTRICULAR RATE: 64 BPM

## 2024-04-15 NOTE — PROGRESS NOTES
4/11/24 Pt's wife, Anabela, called stating that patient may be having a reaction to Amiodarone. Symptoms x about 1 wk. Face and arms rash, some swelling.     Anabela told, per Dr. Moreno, please tell them to stop it.  Pt or wife to call back Monday with update.      4/15/24  Pt's wife, Anabela called stating that all his symptoms have subsided. No more rash on face/arms, swelling gone. Did ask if Dr. Moreno would like to put him on anther medication?     Anabela told\, per Dr. Moreno, no new medication at this time.     -ESEQUIEL Palomo RN

## 2024-05-09 DIAGNOSIS — E78.49 OTHER HYPERLIPIDEMIA: ICD-10-CM

## 2024-05-09 RX ORDER — ATORVASTATIN CALCIUM 80 MG/1
80 TABLET, FILM COATED ORAL EVERY OTHER DAY
Qty: 90 TABLET | Refills: 1 | Status: SHIPPED | OUTPATIENT
Start: 2024-05-09 | End: 2025-05-04

## 2024-05-09 NOTE — TELEPHONE ENCOUNTER
PT  spouse called stated that his Atorvastatin needs to be refilled but was told by pharmacy it has  please advise

## 2024-08-13 ENCOUNTER — APPOINTMENT (OUTPATIENT)
Dept: PRIMARY CARE | Facility: CLINIC | Age: 64
End: 2024-08-13
Payer: COMMERCIAL

## 2024-08-27 ENCOUNTER — OFFICE VISIT (OUTPATIENT)
Dept: CARDIOLOGY | Facility: CLINIC | Age: 64
End: 2024-08-27
Payer: COMMERCIAL

## 2024-08-27 VITALS
WEIGHT: 263.9 LBS | SYSTOLIC BLOOD PRESSURE: 154 MMHG | HEIGHT: 73 IN | DIASTOLIC BLOOD PRESSURE: 90 MMHG | HEART RATE: 69 BPM | OXYGEN SATURATION: 96 % | BODY MASS INDEX: 34.97 KG/M2

## 2024-08-27 DIAGNOSIS — I48.91 ATRIAL FIBRILLATION, UNSPECIFIED TYPE (MULTI): ICD-10-CM

## 2024-08-27 PROCEDURE — 3077F SYST BP >= 140 MM HG: CPT | Performed by: INTERNAL MEDICINE

## 2024-08-27 PROCEDURE — 99214 OFFICE O/P EST MOD 30 MIN: CPT | Performed by: INTERNAL MEDICINE

## 2024-08-27 PROCEDURE — 3080F DIAST BP >= 90 MM HG: CPT | Performed by: INTERNAL MEDICINE

## 2024-08-27 PROCEDURE — 93010 ELECTROCARDIOGRAM REPORT: CPT | Performed by: INTERNAL MEDICINE

## 2024-08-27 PROCEDURE — 1036F TOBACCO NON-USER: CPT | Performed by: INTERNAL MEDICINE

## 2024-08-27 PROCEDURE — 93005 ELECTROCARDIOGRAM TRACING: CPT | Performed by: INTERNAL MEDICINE

## 2024-08-27 PROCEDURE — 99214 OFFICE O/P EST MOD 30 MIN: CPT | Mod: 25 | Performed by: INTERNAL MEDICINE

## 2024-08-27 PROCEDURE — 3008F BODY MASS INDEX DOCD: CPT | Performed by: INTERNAL MEDICINE

## 2024-08-27 ASSESSMENT — PAIN SCALES - GENERAL: PAINLEVEL: 0-NO PAIN

## 2024-08-27 ASSESSMENT — ENCOUNTER SYMPTOMS
DEPRESSION: 0
OCCASIONAL FEELINGS OF UNSTEADINESS: 0
LOSS OF SENSATION IN FEET: 0

## 2024-08-27 ASSESSMENT — COLUMBIA-SUICIDE SEVERITY RATING SCALE - C-SSRS
1. IN THE PAST MONTH, HAVE YOU WISHED YOU WERE DEAD OR WISHED YOU COULD GO TO SLEEP AND NOT WAKE UP?: NO
6. HAVE YOU EVER DONE ANYTHING, STARTED TO DO ANYTHING, OR PREPARED TO DO ANYTHING TO END YOUR LIFE?: NO
2. HAVE YOU ACTUALLY HAD ANY THOUGHTS OF KILLING YOURSELF?: NO

## 2024-08-27 NOTE — PROGRESS NOTES
Referred by Damián Santos MD provider found for   Chief Complaint   Patient presents with    Atrial Fibrillation     fuv        Enrrique Velásquez is a 63 y.o. year old male patient with h/o A Fib s/p RFA 2.5 years ago. Had recurrence of atypical A Flutter and underwent amio load and DCC. Presents today for follow up.     PMHx/PSHx: As above    FamHx: unremarkable     Allergies:  Allergies   Allergen Reactions    Amiodarone Rash    Rosuvastatin Unknown and Other     Weakness in joints    Metoprolol Rash        Review of Systems    Constitutional: not feeling tired.   Eyes: no eyesight problems.   ENT: no hearing loss and no nosebleeds.   Cardiovascular: no intermittent leg claudication and as noted in HPI.   Respiratory: no chronic cough and no shortness of breath.   Gastrointestinal: no change in bowel habits and no blood in stools.   Genitourinary: no urinary frequency and no hematuria.   Skin: no skin rashes.   Neurological: no seizures and no frequent falls.   Psychiatric: no depression and not suicidal.   All other systems have been reviewed and are negative for complaint.     Outpatient Medications:  Current Outpatient Medications   Medication Instructions    acetaminophen (Tylenol) 325 mg tablet oral, Every 6 hours PRN    ALPRAZolam (XANAX) 0.25 mg, oral, Daily PRN    apixaban (Eliquis) 5 mg tablet TAKE 1 TABLET BY MOUTH TWO TIMES A DAY    apixaban (ELIQUIS) 5 mg, oral, 2 times daily    aspirin 81 mg EC tablet 1 tablet, oral, Daily    atorvastatin (LIPITOR) 80 mg, oral, Every other day    benazepril (LOTENSIN) 40 mg, oral, Daily    carvedilol (COREG) 25 mg, oral, 2 times daily    cholecalciferol (Vitamin D-3) 25 MCG (1000 UT) capsule 1 capsule, oral, Daily    cyanocobalamin, vitamin B-12, 5,000 mcg tablet,disintegrating 1 tablet, oral, Daily    ezetimibe (ZETIA) 10 mg, oral, Daily    fish oil concentrate (Omega-3) 120-180 mg capsule 1 capsule, oral, Daily         Last Recorded Vitals:      3/19/2024     3:30  "PM 3/19/2024     3:45 PM 3/19/2024     4:00 PM 3/19/2024     4:30 PM 3/19/2024     4:45 PM 3/26/2024     1:11 PM 8/27/2024    12:54 PM   Vitals   Systolic 110 115 116 108 119 148 154   Diastolic 90 90 90 78 86 89 90   Heart Rate 79 79 78 77 86 61 69   Resp 14 10 14 18 16     Height (in)      1.854 m (6' 1\") 1.854 m (6' 1\")   Weight (lb)      259 263.9   BMI      34.17 kg/m2 34.82 kg/m2   BSA (m2)      2.45 m2 2.49 m2   Visit Report Report Report Report Report Report Report Report    Visit Vitals  /90 (BP Location: Left arm, Patient Position: Sitting)   Pulse 69   Ht 1.854 m (6' 1\")   Wt 120 kg (263 lb 14.4 oz)   SpO2 96%   BMI 34.82 kg/m²   Smoking Status Former   BSA 2.49 m²        Physical Exam:  Constitutional: alert and in no acute distress.   Eyes: no erythema, swelling or discharge from the eye .   Neck: neck is supple, symmetric, trachea midline, no masses  and no thyromegaly .   Pulmonary: no increased work of breathing or signs of respiratory distress  and lungs clear to auscultation.    Cardiovascular: carotid pulses 2+ bilaterally with no bruit , JVP was normal, no thrills , regular rhythm, normal S1 and S2, no murmurs , pedal pulses 2+ bilaterally  and no edema .   Abdomen: abdomen non-tender, no masses  and no hepatomegaly .   Skin: skin warm and dry, normal skin turgor .   Psychiatric judgment and insight is normal  and oriented to person, place and time .        Assessment/Plan   Problem List Items Addressed This Visit             ICD-10-CM    Atrial fibrillation (Multi) I48.91    Relevant Orders    ECG 12 lead (Clinic Performed)       Enrrique Velásquez is a 63 y.o. year old male patient with h/o A Fib s/p RFA 2.5 years ago. Had recurrence of atypical A Flutter and underwent amio load and DCC. Presents today for follow up.   His current ECG shows NSR with narrow QRS and HR of 67 bpm. The patient reports doing well and denies symptoms. He is off AAD at this point. Will continue his current " medications and follow up in 1 year.         Damián Moreno MD  Cardiac Electrophysiology      Thank you very much for allowing me to participate in the care of this pleasant patient. Please do not hesitate to contact me with any further questions or concerns regarding his care.    **Disclaimer: This note was dictated by speech recognition, and every effort has been made to prevent any error in transcription, however minor errors may be present**

## 2024-08-29 LAB
ATRIAL RATE: 67 BPM
P AXIS: 62 DEGREES
P OFFSET: 207 MS
P ONSET: 144 MS
PR INTERVAL: 152 MS
Q ONSET: 220 MS
QRS COUNT: 11 BEATS
QRS DURATION: 104 MS
QT INTERVAL: 394 MS
QTC CALCULATION(BAZETT): 416 MS
QTC FREDERICIA: 409 MS
R AXIS: -55 DEGREES
T AXIS: 75 DEGREES
T OFFSET: 417 MS
VENTRICULAR RATE: 67 BPM

## 2024-08-30 ENCOUNTER — APPOINTMENT (OUTPATIENT)
Dept: PRIMARY CARE | Facility: CLINIC | Age: 64
End: 2024-08-30
Payer: COMMERCIAL

## 2024-09-03 ENCOUNTER — APPOINTMENT (OUTPATIENT)
Dept: PRIMARY CARE | Facility: CLINIC | Age: 64
End: 2024-09-03
Payer: COMMERCIAL

## 2024-09-03 VITALS
WEIGHT: 262.4 LBS | SYSTOLIC BLOOD PRESSURE: 130 MMHG | TEMPERATURE: 98.4 F | HEART RATE: 70 BPM | RESPIRATION RATE: 16 BRPM | BODY MASS INDEX: 34.62 KG/M2 | DIASTOLIC BLOOD PRESSURE: 80 MMHG

## 2024-09-03 DIAGNOSIS — F41.1 GENERALIZED ANXIETY DISORDER: ICD-10-CM

## 2024-09-03 DIAGNOSIS — E13.69 OTHER SPECIFIED DIABETES MELLITUS WITH OTHER SPECIFIED COMPLICATION, UNSPECIFIED WHETHER LONG TERM INSULIN USE (MULTI): ICD-10-CM

## 2024-09-03 DIAGNOSIS — I10 BENIGN ESSENTIAL HTN: Primary | ICD-10-CM

## 2024-09-03 DIAGNOSIS — E78.49 OTHER HYPERLIPIDEMIA: ICD-10-CM

## 2024-09-03 PROCEDURE — 3079F DIAST BP 80-89 MM HG: CPT | Performed by: INTERNAL MEDICINE

## 2024-09-03 PROCEDURE — 4010F ACE/ARB THERAPY RXD/TAKEN: CPT | Performed by: INTERNAL MEDICINE

## 2024-09-03 PROCEDURE — 99214 OFFICE O/P EST MOD 30 MIN: CPT | Performed by: INTERNAL MEDICINE

## 2024-09-03 PROCEDURE — 1036F TOBACCO NON-USER: CPT | Performed by: INTERNAL MEDICINE

## 2024-09-03 PROCEDURE — 3075F SYST BP GE 130 - 139MM HG: CPT | Performed by: INTERNAL MEDICINE

## 2024-09-03 RX ORDER — ALPRAZOLAM 0.25 MG/1
0.25 TABLET ORAL DAILY PRN
Qty: 30 TABLET | Refills: 0 | Status: SHIPPED | OUTPATIENT
Start: 2024-09-03

## 2024-09-03 NOTE — PROGRESS NOTES
Enrrique Velásquez is a 63 y.o. male   Patient with a past medical history of Atrial Fibrillation (post DCCV), HTN, HLD, Anxiety disorder, GERD, elevated calciums scores. Glucose intolerance, Tubular Adenoma due in 2026    Feels fine  No chest pain/  SOB/ dizziness  BM OK  Energy level ok  Appetite OK             Review of Systems     Constitutional: no fever, no chills, not feeling poorly, not feeling tired and no recent weight gain, no recent weight loss.   ENT: no earache, no hearing loss, no nosebleeds, no nasal discharge, no sore throat and no hoarseness.   Cardiovascular: the heart rate was not slow, the heart rate was not fast, no chest pain, no palpitations, no intermittent leg claudication and no lower extremity edema.   Respiratory: no cough, wheezing or shortness of breath at rest or exertion  Gastrointestinal: no abdominal pain, no constipation, no melena, no nausea, no diarrhea, no vomiting and no blood in stools.   Musculoskeletal: no arthralgias, no myalgias, no back pain, no joint swelling, no joint stiffness, no limb pain and no limb swelling.   Integumentary: no skin rashes, no skin lesions, no itching, no skin wound and no dry skin.   Neurological: no headache, no confusion, no numbness, no dizziness, no tingling and no fainting.   All other systems have been reviewed and are negative for complaint.       Vitals:    09/03/24 1223   BP: 130/80   Pulse: 70   Resp: 16   Temp: 36.9 °C (98.4 °F)        Physical Exam     Constitutional   General appearance: Alert and in no acute distress.     Pulmonary   Respiratory assessment: No respiratory distress, normal respiratory rhythm and effort.    Auscultation of Lungs: Clear bilateral breath sounds.   Cardiovascular   Auscultation of heart: Apical pulse normal, heart rate and rhythm normal, normal S1 and S2, no murmurs and no pericardial rub.    Exam for edema: No peripheral edema.   Abdomen   Abdominal Exam: No bruits, normal bowel sounds, soft, non-tender, no  abdominal mass palpated.    Liver and Spleen exam: No hepato-splenomegaly.   Musculoskeletal   Examination of gait: Normal.    Inspection of digits and nails: No clubbing or cyanosis of the fingernails.    Inspection/palpation of joints, bones and muscles: No joint swelling. Normal movement of all extremities.   Skin   Skin inspection: Normal skin color and pigmentation, normal skin turgor and no visible rash.   Neurologic   Cranial nerves: Nerves 2-12 were intact, no focal neuro defects.      Assessment/Plan             Patient with a past medical history of Atrial Fibrillation (post DCCV), HTN, HLD with elevated CAC, Anxiety disorder, GERD, elevated calciums scores. Glucose intolerance, Tubular Adenoma due in 2026    # HTN  Stable  BP at home is average 120/80      # Afib  PAF  S/p cardioversion  Back on Eliquis  In NSR    # HLD  lipid  Watch salt, avoid excessive caffeine  Avoid processed meats/ sugars/juices Instead eat fresh fruit  Add walnuts and almonds to your diet  exercise 6 days a week for 30 minutes        # Anxiety disorder  stable  Will get consent next visit  [place]RS reviewed      # Hyperglycemia  Check A1c

## 2024-09-04 ENCOUNTER — LAB (OUTPATIENT)
Dept: LAB | Facility: LAB | Age: 64
End: 2024-09-04
Payer: COMMERCIAL

## 2024-09-04 DIAGNOSIS — E13.69 OTHER SPECIFIED DIABETES MELLITUS WITH OTHER SPECIFIED COMPLICATION, UNSPECIFIED WHETHER LONG TERM INSULIN USE (MULTI): ICD-10-CM

## 2024-09-04 DIAGNOSIS — E78.49 OTHER HYPERLIPIDEMIA: ICD-10-CM

## 2024-09-04 LAB
CHOLEST SERPL-MCNC: 153 MG/DL (ref 0–199)
CHOLESTEROL/HDL RATIO: 3.4
EST. AVERAGE GLUCOSE BLD GHB EST-MCNC: 117 MG/DL
HBA1C MFR BLD: 5.7 %
HDLC SERPL-MCNC: 44.6 MG/DL
LDLC SERPL CALC-MCNC: 89 MG/DL
NON HDL CHOLESTEROL: 108 MG/DL (ref 0–149)
TRIGL SERPL-MCNC: 95 MG/DL (ref 0–149)
VLDL: 19 MG/DL (ref 0–40)

## 2024-09-04 PROCEDURE — 83036 HEMOGLOBIN GLYCOSYLATED A1C: CPT

## 2024-09-04 PROCEDURE — 80061 LIPID PANEL: CPT

## 2024-09-04 PROCEDURE — 36415 COLL VENOUS BLD VENIPUNCTURE: CPT

## 2024-10-08 ENCOUNTER — APPOINTMENT (OUTPATIENT)
Dept: PRIMARY CARE | Facility: CLINIC | Age: 64
End: 2024-10-08
Payer: COMMERCIAL

## 2024-12-23 ENCOUNTER — APPOINTMENT (OUTPATIENT)
Dept: PRIMARY CARE | Facility: CLINIC | Age: 64
End: 2024-12-23
Payer: COMMERCIAL

## 2024-12-23 VITALS
DIASTOLIC BLOOD PRESSURE: 70 MMHG | WEIGHT: 263 LBS | TEMPERATURE: 98.4 F | RESPIRATION RATE: 16 BRPM | SYSTOLIC BLOOD PRESSURE: 120 MMHG | HEART RATE: 68 BPM | BODY MASS INDEX: 34.7 KG/M2

## 2024-12-23 DIAGNOSIS — F41.9 ANXIETY DISORDER, UNSPECIFIED TYPE: ICD-10-CM

## 2024-12-23 DIAGNOSIS — F41.1 GENERALIZED ANXIETY DISORDER: ICD-10-CM

## 2024-12-23 DIAGNOSIS — E78.49 OTHER HYPERLIPIDEMIA: ICD-10-CM

## 2024-12-23 DIAGNOSIS — I48.0 PAROXYSMAL ATRIAL FIBRILLATION (MULTI): ICD-10-CM

## 2024-12-23 DIAGNOSIS — I10 BENIGN ESSENTIAL HTN: Primary | ICD-10-CM

## 2024-12-23 LAB
AMPHETAMINES UR QL SCN: NORMAL
BARBITURATES UR QL SCN: NORMAL
BENZODIAZ UR QL SCN: NORMAL
BZE UR QL SCN: NORMAL
CANNABINOIDS UR QL SCN: NORMAL
FENTANYL+NORFENTANYL UR QL SCN: NORMAL
METHADONE UR QL SCN: NORMAL
OPIATES UR QL SCN: NORMAL
OXYCODONE+OXYMORPHONE UR QL SCN: NORMAL
PCP UR QL SCN: NORMAL

## 2024-12-23 PROCEDURE — 3074F SYST BP LT 130 MM HG: CPT | Performed by: INTERNAL MEDICINE

## 2024-12-23 PROCEDURE — 99214 OFFICE O/P EST MOD 30 MIN: CPT | Performed by: INTERNAL MEDICINE

## 2024-12-23 PROCEDURE — 4010F ACE/ARB THERAPY RXD/TAKEN: CPT | Performed by: INTERNAL MEDICINE

## 2024-12-23 PROCEDURE — 3048F LDL-C <100 MG/DL: CPT | Performed by: INTERNAL MEDICINE

## 2024-12-23 PROCEDURE — 3044F HG A1C LEVEL LT 7.0%: CPT | Performed by: INTERNAL MEDICINE

## 2024-12-23 PROCEDURE — 3078F DIAST BP <80 MM HG: CPT | Performed by: INTERNAL MEDICINE

## 2024-12-23 PROCEDURE — 80307 DRUG TEST PRSMV CHEM ANLYZR: CPT

## 2024-12-23 RX ORDER — CARVEDILOL 25 MG/1
25 TABLET ORAL 2 TIMES DAILY
Qty: 180 TABLET | Refills: 3 | Status: SHIPPED | OUTPATIENT
Start: 2024-12-23

## 2024-12-23 RX ORDER — ALPRAZOLAM 0.25 MG/1
0.25 TABLET ORAL DAILY PRN
Qty: 30 TABLET | Refills: 0 | Status: SHIPPED | OUTPATIENT
Start: 2024-12-23

## 2024-12-23 RX ORDER — BENAZEPRIL HYDROCHLORIDE 40 MG/1
40 TABLET ORAL DAILY
Qty: 90 TABLET | Refills: 3 | Status: SHIPPED | OUTPATIENT
Start: 2024-12-23

## 2024-12-23 RX ORDER — ATORVASTATIN CALCIUM 80 MG/1
80 TABLET, FILM COATED ORAL EVERY OTHER DAY
Qty: 90 TABLET | Refills: 1 | Status: SHIPPED | OUTPATIENT
Start: 2024-12-23 | End: 2025-12-18

## 2024-12-23 RX ORDER — EZETIMIBE 10 MG/1
10 TABLET ORAL DAILY
Qty: 90 TABLET | Refills: 3 | Status: SHIPPED | OUTPATIENT
Start: 2024-12-23

## 2024-12-23 NOTE — PROGRESS NOTES
Enrrique Velásquez is a 64 y.o. male   Patient with a past medical history of Atrial Fibrillation (post DCCV), HTN, HLD, Anxiety disorder, GERD, elevated calciums scores. Glucose intolerance, Tubular Adenoma due in 2026    Right elbow hurts  Was carrying ice buckets    No chest pain/  SOB/ dizziness  BM OK  Energy level ok  Appetite OK             Review of Systems     Constitutional: no fever, no chills, not feeling poorly, not feeling tired and no recent weight gain, no recent weight loss.   ENT: no earache, no hearing loss, no nosebleeds, no nasal discharge, no sore throat and no hoarseness.   Cardiovascular: the heart rate was not slow, the heart rate was not fast, no chest pain, no palpitations, no intermittent leg claudication and no lower extremity edema.   Respiratory: no cough, wheezing or shortness of breath at rest or exertion  Gastrointestinal: no abdominal pain, no constipation, no melena, no nausea, no diarrhea, no vomiting and no blood in stools.   Musculoskeletal: no arthralgias, no myalgias, no back pain, no joint swelling, no joint stiffness, no limb pain and no limb swelling.   Integumentary: no skin rashes, no skin lesions, no itching, no skin wound and no dry skin.   Neurological: no headache, no confusion, no numbness, no dizziness, no tingling and no fainting.   All other systems have been reviewed and are negative for complaint.       Vitals:    12/23/24 1110   Temp: 36.9 °C (98.4 °F)        Physical Exam     Constitutional   General appearance: Alert and in no acute distress.     Pulmonary   Respiratory assessment: No respiratory distress, normal respiratory rhythm and effort.    Auscultation of Lungs: Clear bilateral breath sounds.   Cardiovascular   Auscultation of heart: Apical pulse normal, heart rate and rhythm normal, normal S1 and S2, no murmurs and no pericardial rub.    Exam for edema: No peripheral edema.   Abdomen   Abdominal Exam: No bruits, normal bowel sounds, soft, non-tender, no  abdominal mass palpated.    Liver and Spleen exam: No hepato-splenomegaly.   Musculoskeletal   Examination of gait: Normal.    Inspection of digits and nails: No clubbing or cyanosis of the fingernails.    Inspection/palpation of joints, bones and muscles: No joint swelling. Normal movement of all extremities.   Skin   Skin inspection: Normal skin color and pigmentation, normal skin turgor and no visible rash.   Neurologic   Cranial nerves: Nerves 2-12 were intact, no focal neuro defects.      Assessment/Plan             Patient with a past medical history of Atrial Fibrillation (post DCCV), HTN, HLD with elevated CAC, Anxiety disorder, GERD, elevated calciums scores. Glucose intolerance, Tubular Adenoma due in 2026    # HTN  Stable  BP at home is average 120/80      # Afib  Currently in NSR  Continue Rx    # HLD  lipid  Watch salt, avoid excessive caffeine  Avoid processed meats/ sugars/juices Instead eat fresh fruit  Add walnuts and almonds to your diet  exercise 6 days a week for 30 minutes        # Anxiety disorder  stable  OARRS report has been run and reviewed - no suspicious or worrisome activity noted.  I have considered the risk of abuse, dependance, addiction, and diversion.    I believe it is clinically appropriate for this patient to continue taking this medication.        # Hyperglycemia  A1c of 5.7  Watch diet/ weight loss/ daily exercise

## 2025-01-14 NOTE — Clinical Note
Enrrique Velásquez was seen and treated in our emergency department on 3/19/2024.  He may return to work on 03/20/2024.  Avoid strenuous activity until cleared by cardiologist or primary care physician     If you have any questions or concerns, please don't hesitate to call.      Loraine Ceron MD
108

## 2025-02-17 DIAGNOSIS — I10 BENIGN ESSENTIAL HTN: ICD-10-CM

## 2025-02-17 RX ORDER — BENAZEPRIL HYDROCHLORIDE 40 MG/1
40 TABLET ORAL DAILY
Qty: 90 TABLET | Refills: 3 | Status: SHIPPED | OUTPATIENT
Start: 2025-02-17

## 2025-02-25 DIAGNOSIS — E78.49 OTHER HYPERLIPIDEMIA: ICD-10-CM

## 2025-02-25 RX ORDER — EZETIMIBE 10 MG/1
10 TABLET ORAL DAILY
Qty: 90 TABLET | Refills: 3 | Status: SHIPPED | OUTPATIENT
Start: 2025-02-25

## 2025-03-04 DIAGNOSIS — I48.0 PAROXYSMAL ATRIAL FIBRILLATION (MULTI): ICD-10-CM

## 2025-05-05 DIAGNOSIS — E78.49 OTHER HYPERLIPIDEMIA: ICD-10-CM

## 2025-05-05 RX ORDER — ATORVASTATIN CALCIUM 80 MG/1
80 TABLET, FILM COATED ORAL EVERY OTHER DAY
Qty: 90 TABLET | Refills: 1 | Status: SHIPPED | OUTPATIENT
Start: 2025-05-05 | End: 2026-04-30

## 2025-06-27 ENCOUNTER — APPOINTMENT (OUTPATIENT)
Dept: PRIMARY CARE | Facility: CLINIC | Age: 65
End: 2025-06-27
Payer: COMMERCIAL

## 2025-06-27 VITALS
DIASTOLIC BLOOD PRESSURE: 80 MMHG | TEMPERATURE: 98.3 F | RESPIRATION RATE: 16 BRPM | BODY MASS INDEX: 31.16 KG/M2 | SYSTOLIC BLOOD PRESSURE: 120 MMHG | HEART RATE: 70 BPM | WEIGHT: 236.2 LBS

## 2025-06-27 DIAGNOSIS — E78.49 OTHER HYPERLIPIDEMIA: ICD-10-CM

## 2025-06-27 DIAGNOSIS — E78.49 OTHER HYPERLIPIDEMIA: Primary | ICD-10-CM

## 2025-06-27 DIAGNOSIS — F41.1 GENERALIZED ANXIETY DISORDER: ICD-10-CM

## 2025-06-27 DIAGNOSIS — E13.69 OTHER SPECIFIED DIABETES MELLITUS WITH OTHER SPECIFIED COMPLICATION, UNSPECIFIED WHETHER LONG TERM INSULIN USE (MULTI): ICD-10-CM

## 2025-06-27 DIAGNOSIS — I48.0 PAROXYSMAL ATRIAL FIBRILLATION (MULTI): ICD-10-CM

## 2025-06-27 DIAGNOSIS — I10 BENIGN ESSENTIAL HTN: ICD-10-CM

## 2025-06-27 LAB
POC FINGERSTICK BLOOD GLUCOSE: 123 MG/DL (ref 70–100)
POC HEMOGLOBIN A1C: 6 % (ref 4.2–6.5)

## 2025-06-27 PROCEDURE — 99214 OFFICE O/P EST MOD 30 MIN: CPT | Performed by: INTERNAL MEDICINE

## 2025-06-27 PROCEDURE — 82962 GLUCOSE BLOOD TEST: CPT | Performed by: INTERNAL MEDICINE

## 2025-06-27 PROCEDURE — 3044F HG A1C LEVEL LT 7.0%: CPT | Performed by: INTERNAL MEDICINE

## 2025-06-27 PROCEDURE — 4010F ACE/ARB THERAPY RXD/TAKEN: CPT | Performed by: INTERNAL MEDICINE

## 2025-06-27 PROCEDURE — 3079F DIAST BP 80-89 MM HG: CPT | Performed by: INTERNAL MEDICINE

## 2025-06-27 PROCEDURE — 3074F SYST BP LT 130 MM HG: CPT | Performed by: INTERNAL MEDICINE

## 2025-06-27 PROCEDURE — 83036 HEMOGLOBIN GLYCOSYLATED A1C: CPT | Performed by: INTERNAL MEDICINE

## 2025-06-27 RX ORDER — ALPRAZOLAM 0.25 MG/1
0.25 TABLET ORAL DAILY PRN
Qty: 30 TABLET | Refills: 0 | Status: SHIPPED | OUTPATIENT
Start: 2025-06-27

## 2025-06-27 RX ORDER — EZETIMIBE 10 MG/1
10 TABLET ORAL DAILY
Qty: 90 TABLET | Refills: 3 | Status: SHIPPED | OUTPATIENT
Start: 2025-06-27

## 2025-06-27 RX ORDER — ATORVASTATIN CALCIUM 80 MG/1
80 TABLET, FILM COATED ORAL EVERY OTHER DAY
Qty: 90 TABLET | Refills: 1 | Status: SHIPPED | OUTPATIENT
Start: 2025-06-27 | End: 2026-06-22

## 2025-06-27 NOTE — PROGRESS NOTES
Enrrique Velásquez is a 64 y.o. male   Patient with a past medical history of Atrial Fibrillation (post DCCV), HTN, HLD, Anxiety disorder, GERD, elevated calciums scores. Glucose intolerance, Tubular Adenoma due in 2026    Feels fine  Has lost 30 pounds  Eating healthy    No chest pain/  SOB/ dizziness  BM OK  Energy level ok  Appetite OK      No polyuria  No polydipsia  Nocturia                Review of Systems     Constitutional: no fever, no chills, not feeling poorly, not feeling tired and no recent weight gain, no recent weight loss.   ENT: no earache, no hearing loss, no nosebleeds, no nasal discharge, no sore throat and no hoarseness.   Cardiovascular: the heart rate was not slow, the heart rate was not fast, no chest pain, no palpitations, no intermittent leg claudication and no lower extremity edema.   Respiratory: no cough, wheezing or shortness of breath at rest or exertion  Gastrointestinal: no abdominal pain, no constipation, no melena, no nausea, no diarrhea, no vomiting and no blood in stools.   Musculoskeletal: no arthralgias, no myalgias, no back pain, no joint swelling, no joint stiffness, no limb pain and no limb swelling.   Integumentary: no skin rashes, no skin lesions, no itching, no skin wound and no dry skin.   Neurological: no headache, no confusion, no numbness, no dizziness, no tingling and no fainting.   All other systems have been reviewed and are negative for complaint.       There were no vitals filed for this visit.       Physical Exam     Constitutional   General appearance: Alert and in no acute distress.     Pulmonary   Respiratory assessment: No respiratory distress, normal respiratory rhythm and effort.    Auscultation of Lungs: Clear bilateral breath sounds.   Cardiovascular   Auscultation of heart: Apical pulse normal, heart rate and rhythm normal, normal S1 and S2, no murmurs and no pericardial rub.    Exam for edema: No peripheral edema.   Abdomen   Abdominal Exam: No bruits,  normal bowel sounds, soft, non-tender, no abdominal mass palpated.    Liver and Spleen exam: No hepato-splenomegaly.   Musculoskeletal   Examination of gait: Normal.    Inspection of digits and nails: No clubbing or cyanosis of the fingernails.    Inspection/palpation of joints, bones and muscles: No joint swelling. Normal movement of all extremities.   Skin   Skin inspection: Normal skin color and pigmentation, normal skin turgor and no visible rash.   Neurologic   Cranial nerves: Nerves 2-12 were intact, no focal neuro defects.      Assessment/Plan             Patient with a past medical history of Atrial Fibrillation (post DCCV), HTN, HLD with elevated CAC, Anxiety disorder, GERD, elevated calciums scores. Glucose intolerance, Tubular Adenoma due in 2026    # HTN  Stable  BP at home is average 120/80      # Afib  Currently in NSR  Continue Rx    # HLD  Eating healthy  Watch salt, avoid excessive caffeine  Avoid processed meats/ sugars/juices Instead eat fresh fruit  Add walnuts and almonds to your diet  exercise 6 days a week for 30 minutes        # Anxiety disorder  stable  OARRS report has been run and reviewed - no suspicious or worrisome activity noted.  I have considered the risk of abuse, dependance, addiction, and diversion.    I believe it is clinically appropriate for this patient to continue taking this medication.        # Hyperglycemia  A1c of 6.0  Has lost weight by eating healthy  Encourage the patient to continue eating healthy  Add weight training to maintain muscle mass      Blood work

## 2025-06-27 NOTE — TELEPHONE ENCOUNTER
Rx Refill Request Telephone Encounter    Name:  Enrrique Velásquez  :  [id number]  [facility]

## 2025-07-04 LAB
ALBUMIN SERPL-MCNC: 4.2 G/DL (ref 3.6–5.1)
ALP SERPL-CCNC: 63 U/L (ref 35–144)
ALT SERPL-CCNC: 31 U/L (ref 9–46)
ANION GAP SERPL CALCULATED.4IONS-SCNC: 9 MMOL/L (CALC) (ref 7–17)
AST SERPL-CCNC: 21 U/L (ref 10–35)
BILIRUB SERPL-MCNC: 0.7 MG/DL (ref 0.2–1.2)
BUN SERPL-MCNC: 19 MG/DL (ref 7–25)
CALCIUM SERPL-MCNC: 9.2 MG/DL (ref 8.6–10.3)
CHLORIDE SERPL-SCNC: 105 MMOL/L (ref 98–110)
CHOLEST SERPL-MCNC: 139 MG/DL
CHOLEST/HDLC SERPL: 3.3 (CALC)
CO2 SERPL-SCNC: 25 MMOL/L (ref 20–32)
CREAT SERPL-MCNC: 0.7 MG/DL (ref 0.7–1.35)
EGFRCR SERPLBLD CKD-EPI 2021: 103 ML/MIN/1.73M2
ERYTHROCYTE [DISTWIDTH] IN BLOOD BY AUTOMATED COUNT: 14.4 % (ref 11–15)
GLUCOSE SERPL-MCNC: 110 MG/DL (ref 65–99)
HCT VFR BLD AUTO: 46.8 % (ref 38.5–50)
HDLC SERPL-MCNC: 42 MG/DL
HGB BLD-MCNC: 14.5 G/DL (ref 13.2–17.1)
LDLC SERPL CALC-MCNC: 80 MG/DL (CALC)
MCH RBC QN AUTO: 29 PG (ref 27–33)
MCHC RBC AUTO-ENTMCNC: 31 G/DL (ref 32–36)
MCV RBC AUTO: 93.6 FL (ref 80–100)
NONHDLC SERPL-MCNC: 97 MG/DL (CALC)
PLATELET # BLD AUTO: 247 THOUSAND/UL (ref 140–400)
PMV BLD REES-ECKER: 10.5 FL (ref 7.5–12.5)
POTASSIUM SERPL-SCNC: 4.6 MMOL/L (ref 3.5–5.3)
PROT SERPL-MCNC: 6.6 G/DL (ref 6.1–8.1)
PSA SERPL-MCNC: 3.91 NG/ML
RBC # BLD AUTO: 5 MILLION/UL (ref 4.2–5.8)
SODIUM SERPL-SCNC: 139 MMOL/L (ref 135–146)
TRIGL SERPL-MCNC: 83 MG/DL
TSH SERPL-ACNC: 0.79 MIU/L (ref 0.4–4.5)
WBC # BLD AUTO: 7.5 THOUSAND/UL (ref 3.8–10.8)

## 2025-08-26 ENCOUNTER — APPOINTMENT (OUTPATIENT)
Dept: CARDIOLOGY | Facility: CLINIC | Age: 65
End: 2025-08-26
Payer: COMMERCIAL

## 2025-08-26 VITALS
DIASTOLIC BLOOD PRESSURE: 93 MMHG | HEART RATE: 68 BPM | SYSTOLIC BLOOD PRESSURE: 161 MMHG | WEIGHT: 240.9 LBS | OXYGEN SATURATION: 97 % | BODY MASS INDEX: 31.93 KG/M2 | HEIGHT: 73 IN

## 2025-08-26 DIAGNOSIS — I48.91 ATRIAL FIBRILLATION, UNSPECIFIED TYPE (MULTI): ICD-10-CM

## 2025-08-26 PROCEDURE — 3080F DIAST BP >= 90 MM HG: CPT | Performed by: INTERNAL MEDICINE

## 2025-08-26 PROCEDURE — 4010F ACE/ARB THERAPY RXD/TAKEN: CPT | Performed by: INTERNAL MEDICINE

## 2025-08-26 PROCEDURE — 99214 OFFICE O/P EST MOD 30 MIN: CPT | Performed by: INTERNAL MEDICINE

## 2025-08-26 PROCEDURE — 3077F SYST BP >= 140 MM HG: CPT | Performed by: INTERNAL MEDICINE

## 2025-08-26 PROCEDURE — 99212 OFFICE O/P EST SF 10 MIN: CPT

## 2025-08-26 PROCEDURE — 93005 ELECTROCARDIOGRAM TRACING: CPT | Performed by: INTERNAL MEDICINE

## 2025-08-26 PROCEDURE — 3008F BODY MASS INDEX DOCD: CPT | Performed by: INTERNAL MEDICINE

## 2025-08-26 PROCEDURE — 3044F HG A1C LEVEL LT 7.0%: CPT | Performed by: INTERNAL MEDICINE

## 2025-08-26 ASSESSMENT — PAIN SCALES - GENERAL: PAINLEVEL_OUTOF10: 0-NO PAIN

## 2025-08-26 ASSESSMENT — ENCOUNTER SYMPTOMS
OCCASIONAL FEELINGS OF UNSTEADINESS: 0
LOSS OF SENSATION IN FEET: 1
DEPRESSION: 0

## 2025-08-26 ASSESSMENT — COLUMBIA-SUICIDE SEVERITY RATING SCALE - C-SSRS
6. HAVE YOU EVER DONE ANYTHING, STARTED TO DO ANYTHING, OR PREPARED TO DO ANYTHING TO END YOUR LIFE?: NO
1. IN THE PAST MONTH, HAVE YOU WISHED YOU WERE DEAD OR WISHED YOU COULD GO TO SLEEP AND NOT WAKE UP?: NO
2. HAVE YOU ACTUALLY HAD ANY THOUGHTS OF KILLING YOURSELF?: NO

## 2025-08-26 ASSESSMENT — PATIENT HEALTH QUESTIONNAIRE - PHQ9
1. LITTLE INTEREST OR PLEASURE IN DOING THINGS: NOT AT ALL
2. FEELING DOWN, DEPRESSED OR HOPELESS: NOT AT ALL
SUM OF ALL RESPONSES TO PHQ9 QUESTIONS 1 AND 2: 0

## 2025-08-27 LAB
ATRIAL RATE: 63 BPM
P AXIS: 67 DEGREES
P OFFSET: 206 MS
P ONSET: 141 MS
PR INTERVAL: 162 MS
Q ONSET: 222 MS
QRS COUNT: 10 BEATS
QRS DURATION: 98 MS
QT INTERVAL: 398 MS
QTC CALCULATION(BAZETT): 407 MS
QTC FREDERICIA: 404 MS
R AXIS: -50 DEGREES
T AXIS: 67 DEGREES
T OFFSET: 421 MS
VENTRICULAR RATE: 63 BPM

## 2025-12-29 ENCOUNTER — APPOINTMENT (OUTPATIENT)
Dept: PRIMARY CARE | Facility: CLINIC | Age: 65
End: 2025-12-29
Payer: COMMERCIAL